# Patient Record
Sex: FEMALE | Race: BLACK OR AFRICAN AMERICAN
[De-identification: names, ages, dates, MRNs, and addresses within clinical notes are randomized per-mention and may not be internally consistent; named-entity substitution may affect disease eponyms.]

---

## 2023-05-30 ENCOUNTER — HOSPITAL ENCOUNTER (OUTPATIENT)
Age: 38
Setting detail: OBSERVATION
LOS: 4 days | Discharge: HOME | End: 2023-06-03
Payer: COMMERCIAL

## 2023-05-30 DIAGNOSIS — K21.9: ICD-10-CM

## 2023-05-30 DIAGNOSIS — R10.9: ICD-10-CM

## 2023-05-30 DIAGNOSIS — E87.6: ICD-10-CM

## 2023-05-30 DIAGNOSIS — R11.2: Primary | ICD-10-CM

## 2023-05-30 DIAGNOSIS — Z20.822: ICD-10-CM

## 2023-05-30 DIAGNOSIS — Z79.899: ICD-10-CM

## 2023-05-30 DIAGNOSIS — K50.90: ICD-10-CM

## 2023-05-30 PROCEDURE — 99285 EMERGENCY DEPT VISIT HI MDM: CPT

## 2023-05-30 PROCEDURE — 83690 ASSAY OF LIPASE: CPT

## 2023-05-30 PROCEDURE — 96376 TX/PRO/DX INJ SAME DRUG ADON: CPT

## 2023-05-30 PROCEDURE — 0202U NFCT DS 22 TRGT SARS-COV-2: CPT

## 2023-05-30 PROCEDURE — 82150 ASSAY OF AMYLASE: CPT

## 2023-05-30 PROCEDURE — 85652 RBC SED RATE AUTOMATED: CPT

## 2023-05-30 PROCEDURE — 96374 THER/PROPH/DIAG INJ IV PUSH: CPT

## 2023-05-30 PROCEDURE — 85025 COMPLETE CBC W/AUTO DIFF WBC: CPT

## 2023-05-30 PROCEDURE — 84703 CHORIONIC GONADOTROPIN ASSAY: CPT

## 2023-05-30 PROCEDURE — 80048 BASIC METABOLIC PNL TOTAL CA: CPT

## 2023-05-30 PROCEDURE — 74177 CT ABD & PELVIS W/CONTRAST: CPT

## 2023-05-30 PROCEDURE — 36415 COLL VENOUS BLD VENIPUNCTURE: CPT

## 2023-05-30 PROCEDURE — 87507 IADNA-DNA/RNA PROBE TQ 12-25: CPT

## 2023-05-30 PROCEDURE — G0378 HOSPITAL OBSERVATION PER HR: HCPCS

## 2023-05-30 PROCEDURE — 96375 TX/PRO/DX INJ NEW DRUG ADDON: CPT

## 2023-05-30 PROCEDURE — 80053 COMPREHEN METABOLIC PANEL: CPT

## 2023-05-30 PROCEDURE — 80076 HEPATIC FUNCTION PANEL: CPT

## 2023-05-30 PROCEDURE — 96361 HYDRATE IV INFUSION ADD-ON: CPT

## 2023-05-30 PROCEDURE — 81003 URINALYSIS AUTO W/O SCOPE: CPT

## 2023-05-30 PROCEDURE — 76705 ECHO EXAM OF ABDOMEN: CPT

## 2023-06-01 VITALS
TEMPERATURE: 100 F | SYSTOLIC BLOOD PRESSURE: 183 MMHG | OXYGEN SATURATION: 99 % | RESPIRATION RATE: 18 BRPM | DIASTOLIC BLOOD PRESSURE: 79 MMHG | HEART RATE: 80 BPM

## 2023-06-01 VITALS — SYSTOLIC BLOOD PRESSURE: 144 MMHG | DIASTOLIC BLOOD PRESSURE: 85 MMHG

## 2023-06-01 VITALS
DIASTOLIC BLOOD PRESSURE: 100 MMHG | OXYGEN SATURATION: 97 % | RESPIRATION RATE: 20 BRPM | TEMPERATURE: 98.96 F | HEART RATE: 86 BPM | SYSTOLIC BLOOD PRESSURE: 160 MMHG

## 2023-06-01 LAB
ALANINE AMINOTRANSFERASE: 22 U/L (ref 14–59)
ALBUMIN GLOBULIN RATIO: 0.8
ALBUMIN LEVEL: 3.8 G/DL (ref 3.4–5)
ALKALINE PHOSPHATASE: 85 U/L (ref 46–116)
ASPARTATE AMINO TRANSFERASE: 11 U/L (ref 15–37)
GLOBULIN: 4.8 G/DL
TOTAL PROTEIN: 8.6 G/DL (ref 6.4–8.2)

## 2023-06-01 NOTE — P.PN_ITS
Progress Note: Subjective    
Subjective    
Interval history:     
patient is a 37-year-old -American female with past medical history of   
Crohn's disease GERD who presented with a one-day history of nausea vomiting and  
diarrhea. She reports that her son also had the same symptoms within the last   
few days. She had tried and failed oral antiemetics. She presented to the   
Emergency Room with uncontrolled vomiting and diarrhea despite trying the Zofran  
and Phenergan. CT scan per the emergency department did not reveal any acute   
Crohn's flare, or any acute processes. Patient was admitted to the medicine   
floor for IV fluids and vomiting control. Patient has not improved like expected  
over the course of yesterday, is still requiring some IV antiemetics and some IV  
fluids and is still here this morning. She denies any fevers or chills overnight  
but has been experiencing some increased belly pain right upper quadrant and   
right lower quadrant. She denies any diarrhea but has had some Imodium. She   
still is not able to hold down clear liquids    
    
Exam    
    
    
Narrative:   Exam Narrative:     
    
General: Patient is alert, and oriented to person, place and time with normal   
affect, proper hygiene     
Skin: no visible rashes, or ulcers    
Head: atraumatic, acephalic    
Neck: no masses palpated, normal thyroid, no JVD or audible carotid bruits     
Heart: Normal rate and rhythm, no murmurs/rubs/gallops    
Lungs: no audible wheezes, crackles and normal breath sounds all lung fields     
Abdomen: Normal audible bowel sounds, no distension, No palpable masses, no   
organomegaly, diffuse tenderness without rebound/guarding/ or rigidity    
Musculoskeletal: muscle atrophy noted, ROM is limited due to being in hospital   
bed, no swelling bilateral lower extremities    
Vascular: Normal carotid, radial, femoral, posterior tibial, and dorsalis pedis   
pulses    
Lymph: no supraclavicular, axillary, or anterior/posterior cervical adenopathy     
Neuro: CN II-X grossly intact, normal sensation upper and lower extremities    
       
    
    
Progress Note: Objective    
Labs    
Labs:     
                                 Liver Function    
    
    
    
  06/01/23 Range/Units    
    
  04:49     
     
Total Bilirubin  0.4  (0.2-1.0)  mg/dL    
     
Direct Bilirubin  0.1  (0.0-0.2)  mg/dL    
     
AST  11 L  (15-37)  U/L    
     
ALT  22  (14-59)  U/L    
     
Albumin  3.8  (3.4-5.0)  g/dL    
    
    
    
    
Progress Note: A&P    
Assessment and Plan    
(1) Intractable vomiting with nausea:     
(2) Abdominal pain:     
(3) Crohn's disease:     
    
Plan    
#1 intractable vomiting and diarrhea-most likely etiology is viral   
gastroenteritis. We'll treat with IV Phenergan and IV Zofran, IV fluids, clear   
liquid diet, Bentyl and will be discharged home once able to keep down oral or   
liquid. With worsening abdominal pain today rechecked liver enzymes which were   
within normal limits and we'll check a right upper quadrant ultrasound. Again CT  
scan was reviewed from the time of admission and showed no acute findings   
including the gallbladder.    
#2 Crohn's disease appears in no active flare    
#3 GERD continue IV Pepcid    
Patient is a full code     
patient will be admitted to observation status and is not expected to stay more   
than 2 nights    
Fall Risk Details    
Current Medications:     
                               Current Medications    
    
Al Hydrox/Mg Hydrox/Simethicone (Maalox (Mag Hydrox/Aluminum Hyd/Simeth) 30 Ml   
Oral.Susp)  30 ml PO Q4H PRN    
   PRN Reason: Indigestion    
Dextrose/Sodium Chloride (Dextrose 5 %-0.45 % Sod Chlor 1,000 Ml Iv.Soln)  75 ml  
IV CONT LUIS M    
Famotidine (Famotidine/Pf 20 Mg/2 Ml Vial)  20 mg IV Q12H LUIS M    
   Last Admin: 06/01/23 10:18 Dose:  20 mg    
       
Hydromorphone HCl (Hydromorphone Hcl 1 Mg/Ml Cartridge)  0.2 mg IVP Q4HWA PRN    
   PRN Reason: Pain    
   Last Admin: 06/01/23 10:35 Dose:  0.2 mg    
       
Potassium Chloride 40 meq/ (Sodium Chloride)  270 mls @ 67.5 mls/hr IV ONCE ONE    
   Stop: 06/01/23 14:48    
   Last Admin: 06/01/23 11:27 Dose:  40 ml/hr, 40 mls/hr    
       
Ondansetron HCl (Ondansetron Pf 4 Mg/2 Ml Vial)  4 mg IV Q6H PRN    
   PRN Reason: Nausea    
Polyethylene Glycol (Polyethylene Glycol 3350 17 Gm Powder Packet)  17 gm PO QD   
PRN    
   PRN Reason: Constipation    
Promethazine HCl (Promethazine Hcl 25 Mg/Ml Vial)  12.5 mg IV Q4H PRN    
   PRN Reason: Nausea And Vomiting    
   Last Admin: 06/01/23 10:35 Dose:  12.5 mg    
       
Tramadol HCl (Tramadol Hcl 50 Mg Tablet)  50 mg PO Q4HWA PRN    
   PRN Reason: Pain    
    
    
Time Spent With Patient    
Time:     
Total time spent is greater than 50% in coordination of care (as documented) at   
patient's floor/unit and/or counseling patient:    
    
Time with patient: less than 15 minutes

## 2023-06-01 NOTE — US_ITS
[address] [address] 
     [phone] 
  
  
Patient Name: 
JOSE ELIAS VILLANUEVA 
  
MRN: [id number]    YOB: 1985    Sex: F 
[facility] Location: [facility] 
Current Patient Location: [facility] 
Accession/Order Number: [id number] 
Exam Date: 6/01/2023  11:00    Report Date: 6/01/2023  11:42 
  
At the request of: 
АНДРЕЙ DARBY   
  
Procedure:  US right upper quadrant 
  
EXAMINATION: US right upper quadrant  
  
HISTORY: vomiting and RUQ pain , epigastric pain, nausea 
  
COMPARISON: CT abdomen pelvis 5/30/2023 
  
TECHNIQUE: Transabdominal evaluation of the right upper quadrant. 
  
FINDINGS: 
LIVER: Slightly increased echogenicity suggestive of fatty infiltration. Color  
  
Doppler demonstrates patent hepatic veins. 
PORTAL VEIN: Duplex Doppler demonstrates normal hepatopetal flow pattern with  
flow velocity averaging 34 cm/s. 
GALLBLADDER: No visible gallstones, wall thickening, or pericholecystic free  
fluid. Negative sonographic Tucker's sign. 
BILIARY: No abnormal dilation or stones. Common bile duct diameter is within  
normal limits. 
PANCREASE: No visible mass, abnormal atrophy, or duct dilation. 
KIDNEY: No hydronephrosis. No visible mass or stones. Size: 11.3 x 4.7 x 5.2  
cm 
  
IMPRESSION:  
  
1. Mild fatty infiltration of the liver. 
2. Otherwise unremarkable right upper quadrant. 
  
  
Electronically authenticated by: ROSIBEL LEBLANC   Date: 6/01/2023  11:42

## 2023-06-02 VITALS
HEART RATE: 94 BPM | OXYGEN SATURATION: 97 % | DIASTOLIC BLOOD PRESSURE: 96 MMHG | SYSTOLIC BLOOD PRESSURE: 170 MMHG | RESPIRATION RATE: 18 BRPM | TEMPERATURE: 99.5 F

## 2023-06-02 VITALS — SYSTOLIC BLOOD PRESSURE: 190 MMHG | DIASTOLIC BLOOD PRESSURE: 108 MMHG

## 2023-06-02 VITALS
HEART RATE: 85 BPM | TEMPERATURE: 98.1 F | OXYGEN SATURATION: 97 % | SYSTOLIC BLOOD PRESSURE: 141 MMHG | RESPIRATION RATE: 16 BRPM | DIASTOLIC BLOOD PRESSURE: 80 MMHG

## 2023-06-02 VITALS
DIASTOLIC BLOOD PRESSURE: 106 MMHG | HEART RATE: 104 BPM | OXYGEN SATURATION: 98 % | TEMPERATURE: 98.8 F | SYSTOLIC BLOOD PRESSURE: 190 MMHG | RESPIRATION RATE: 16 BRPM

## 2023-06-02 VITALS — OXYGEN SATURATION: 99 %

## 2023-06-02 VITALS — SYSTOLIC BLOOD PRESSURE: 190 MMHG | DIASTOLIC BLOOD PRESSURE: 106 MMHG

## 2023-06-02 LAB
ADD MANUAL DIFF: NO
ALANINE AMINOTRANSFERASE: 19 U/L (ref 14–59)
ALBUMIN GLOBULIN RATIO: 0.8
ALBUMIN LEVEL: 3.7 G/DL (ref 3.4–5)
ALKALINE PHOSPHATASE: 83 U/L (ref 46–116)
ANION GAP: 15.7
ASPARTATE AMINO TRANSFERASE: 11 U/L (ref 15–37)
BLOOD UREA NITROGEN: 10 MG/DL (ref 7–18)
CALCIUM: 9.3 MG/DL (ref 8.5–10.1)
CARBON DIOXIDE: 25 MMOL/L (ref 21–32)
CHLORIDE: 99 MMOL/L (ref 98–107)
CO2 BLD-SCNC: 25 MMOL/L (ref 21–32)
ESTIMATED GFR (AFRICAN AMERICA: >60 (ref 60–?)
ESTIMATED GFR (NON-AFRICAN AME: >60 (ref 60–?)
GLOBULIN: 4.8 G/DL
GLUCOSE BLD-MCNC: 131 MG/DL (ref 74–106)
HCT VFR BLD CALC: 43.1 % (ref 36–48)
HEMATOCRIT: 43.1 % (ref 36–48)
HEMOGLOBIN: 14.7 G/DL (ref 12–16)
IMMATURE GRANULOCYTES ABS AUTO: 0.04 10^3/UL (ref 0–0.03)
IMMATURE GRANULOCYTES PCT AUTO: 0.3 % (ref 0–0.5)
LYMPHOCYTES  ABSOLUTE AUTO: 3.2 10^3/UL (ref 1.2–3.8)
MCV RBC: 84 FL (ref 81–99)
MEAN CORPUSCULAR HEMOGLOBIN: 28.7 PG (ref 26.7–34)
MEAN CORPUSCULAR HGB CONC: 34.1 G/DL (ref 29.9–35.2)
MEAN CORPUSCULAR VOLUME: 84 FL (ref 81–99)
PLATELET # BLD: 405 10^3/UL (ref 150–450)
PLATELET COUNT: 405 10^3/UL (ref 150–450)
POTASSIUM SERPLBLD-SCNC: 2.7 MMOL/L (ref 3.5–5.1)
POTASSIUM: 2.7 MMOL/L (ref 3.5–5.1)
RED BLOOD COUNT: 5.13 10^6/UL (ref 4.2–5.4)
SODIUM BLD-SCNC: 137 MMOL/L (ref 136–145)
SODIUM: 137 MMOL/L (ref 136–145)
TOTAL PROTEIN: 8.5 G/DL (ref 6.4–8.2)
WBC # BLD: 13.5 10^3/UL (ref 4–11)
WHITE BLOOD COUNT: 13.5 10^3/UL (ref 4–11)

## 2023-06-02 NOTE — CM.NOTE
Rounds made with Dr. Mccormack, pt continues with nausea and dirrhea.  Dr. Mccormack will order respiratory panel for today and possible discharge to home this afternoon.

## 2023-06-02 NOTE — PM.PN
Progress Note: Subjective
Subjective
Interval history: 
patient is a 37-year-old -American female with past medical history of Crohn's disease GERD who presented with a one-day history of nausea vomiting and diarrhea. She reports that her son also had the same symptoms within the last few days. 
She had tried and failed oral antiemetics. She presented to the Emergency Room with uncontrolled vomiting and diarrhea despite trying the Zofran and Phenergan. CT scan per the emergency department did not reveal any acute Crohn's flare, or any acute 
processes. Patient was admitted to the medicine floor for IV fluids and vomiting control. Patient has not improved like expected over the course of yesterday, is still requiring some IV antiemetics and some IV fluids and is still here this morning. 
She denies any fevers or chills overnight but has been experiencing some increased belly pain right upper quadrant and right lower quadrant. She denies any diarrhea but has had some Imodium. She still is not able to hold down clear liquids, vomited 
jello this morning.

Exam
Narrative:   Exam Narrative: 

General: Patient is alert, and oriented to person, place and time with normal affect, proper hygiene 
Skin: no visible rashes, or ulcers
Head: atraumatic, acephalic
Heart: Normal rate and rhythm, no murmurs/rubs/gallops
Lungs: no audible wheezes, crackles and normal breath sounds all lung fields 
Abdomen: Normal audible bowel sounds, no distension, No palpable masses, no organomegaly, diffuse pain with no guarding or rigidity
Musculoskeletal: muscle atrophy noted, ROM is limited due to being in hospital bed, no swelling bilateral lower extremities
Vascular: Normal carotid, radial, femoral, posterior tibial, and dorsalis pedis pulses
Lymph: no supraclavicular, axillary, or anterior/posterior cervical adenopathy 
Neuro: CN II-X grossly intact, normal sensation upper and lower extremities
 
Constitutional:   Vital Signs, click to edit/add: 

Vital Signs - 24 hr

                    06/01/23
20:53      06/01/23
22:18      06/02/23
05:51
Temperature         100 F H                                 99.5 F
Pulse Rate          80                                      94 H
Respiratory Rate    18                                      18
Blood Pressure [Le
ft Arm]             183/79 H            144/85 H            170/96 H
Pulse Oximetry      99                                      97
Oxygen Delivery Me
thod                Room Air                                Room Air



 

Progress Note: Objective
Labs
Labs: 
Short CBC

  06/02/23 Range/Units
  04:19 
WBC  13.5 H  (4.0-11.0)  10^3/uL
Hgb  14.7  (12.0-16.0)  g/dL
Hct  43.1  (36.0-48.0)  %
Plt Count  405  (150-450)  10^3/uL

BMP

  06/02/23
  04:19
Sodium  137
Potassium  2.7 L*
Chloride  99
Carbon Dioxide  25.0
BUN  10.0
Creatinine  0.91
Glucose  131 H
Calcium  9.3

Liver Function

  06/02/23 Range/Units
  04:19 
Total Bilirubin  0.3  (0.2-1.0)  mg/dL
AST  11 L  (15-37)  U/L
ALT  19  (14-59)  U/L
Albumin  3.7  (3.4-5.0)  g/dL



Progress Note: A&P
Assessment and Plan
(1) Intractable vomiting with nausea: 
(2) Abdominal pain: 
(3) Crohn's disease: 

Plan
?#1intractable vomiting and diarrhea-most likely etiology is viral gastroenteritis. We'll treat with IV Phenergan and IV Zofran, IV fluids, clear liquid diet, Bentyl and will be discharged home once able to keep down oral or liquid. With worsening 
abdominal pain today rechecked liver enzymes which were within normal limits and we'll check a right upper quadrant ultrasound. Again CT scan was reviewed from the time of admission and showed no acute findings including the gallbladder. respiratory 
panel negative for flu or covid. symptom management only, d/c one able to tolerate clears
#2 Crohn's disease appears in no active flare
#3 GERD continue IV Pepcid
#4 hypokalemia- from vomiting and diarrhea, replace IV until tolerating PO
Patient is a full code
patient will be admitted to observation status and is not expected to stay more than 2 nights
Fall Risk Details
Choudhary Fall Scale Risk Level: Moderate Fall Risk
Current Medications: 
Current Medications

Al Hydrox/Mg Hydrox/Simethicone (Maalox (Mag Hydrox/Aluminum Hyd/Simeth) 30 Ml Oral.Susp)  30 ml PO Q4H PRN
 PRN Reason: Indigestion
Famotidine (Famotidine/Pf 20 Mg/2 Ml Vial)  20 mg IV Q12H LUIS M
 Last Admin: 06/02/23 08:30 Dose:  20 mg
 
Hydralazine HCl (Hydralazine Hcl 20 Mg/Ml Vial)  10 mg IVP Q4H PRN
 PRN Reason: Blood Pressure - High
 Last Admin: 06/02/23 06:00 Dose:  10 mg
 
Hydromorphone HCl (Hydromorphone Hcl 0.5 Mg/0.5 Ml Syringe)  0.2 mg IV Q4H PRN
 PRN Reason: P
 Last Admin: 06/02/23 11:45 Dose:  0.2 mg
 
Dextrose/Sodium Chloride (D5%-0.45% Nacl Iv Soln)  1,000 mls @ 75 mls/hr IV [id number]
 Last Admin: 06/02/23 04:59 Dose:  75 mls/hr
 
Methylprednisolone Sodium Succinate (Methylprednisolone Sod Succ Pf 40 Mg/Ml Vial)  40 mg IVP Q6H [facility]
 Last Admin: 06/02/23 08:29 Dose:  40 mg
 
Ondansetron HCl (Ondansetron Pf 4 Mg/2 Ml Vial)  4 mg IV Q6H PRN
 PRN Reason: Nausea
 Last Admin: 06/02/23 06:03 Dose:  4 mg
 
Polyethylene Glycol (Polyethylene Glycol 3350 17 Gm Powder Packet)  17 gm PO QD PRN
 PRN Reason: Constipation
Promethazine HCl (Promethazine Hcl 25 Mg/Ml Vial)  12.5 mg IV Q4H PRN
 PRN Reason: Nausea And Vomiting
 Last Admin: 06/02/23 11:45 Dose:  12.5 mg
 
Tramadol HCl (Tramadol Hcl 50 Mg Tablet)  50 mg PO Q4HWA PRN
 PRN Reason: Pain


Time Spent With Patient
Time: 
Total time spent is greater than 50% in coordination of care (as documented) at patient's floor/unit and/or counseling patient:

## 2023-06-02 NOTE — P.PN_ITS
Progress Note: Subjective    
Subjective    
Interval history:     
patient is a 37-year-old -American female with past medical history of   
Crohn's disease GERD who presented with a one-day history of nausea vomiting and  
diarrhea. She reports that her son also had the same symptoms within the last   
few days. She had tried and failed oral antiemetics. She presented to the   
Emergency Room with uncontrolled vomiting and diarrhea despite trying the Zofran  
and Phenergan. CT scan per the emergency department did not reveal any acute   
Crohn's flare, or any acute processes. Patient was admitted to the medicine   
floor for IV fluids and vomiting control. Patient has not improved like expected  
over the course of yesterday, is still requiring some IV antiemetics and some IV  
fluids and is still here this morning. She denies any fevers or chills overnight  
but has been experiencing some increased belly pain right upper quadrant and   
right lower quadrant. She denies any diarrhea but has had some Imodium. She   
still is not able to hold down clear liquids, vomited jello this morning.    
    
Exam    
    
    
Narrative:   Exam Narrative:     
    
General: Patient is alert, and oriented to person, place and time with normal   
affect, proper hygiene     
Skin: no visible rashes, or ulcers    
Head: atraumatic, acephalic    
Heart: Normal rate and rhythm, no murmurs/rubs/gallops    
Lungs: no audible wheezes, crackles and normal breath sounds all lung fields     
Abdomen: Normal audible bowel sounds, no distension, No palpable masses, no   
organomegaly, diffuse pain with no guarding or rigidity    
Musculoskeletal: muscle atrophy noted, ROM is limited due to being in hospital   
bed, no swelling bilateral lower extremities    
Vascular: Normal carotid, radial, femoral, posterior tibial, and dorsalis pedis   
pulses    
Lymph: no supraclavicular, axillary, or anterior/posterior cervical adenopathy     
Neuro: CN II-X grossly intact, normal sensation upper and lower extremities    
       
     
Constitutional:   Vital Signs, click to edit/add:     
    
                               Vital Signs - 24 hr    
    
                    06/01/23    
20:53      06/01/23    
22:18      06/02/23    
05:51    
Temperature         100 F H                                 99.5 F    
Pulse Rate          80                                      94 H    
Respiratory Rate    18                                      18    
Blood Pressure [Le    
ft Arm]             183/79 H            144/85 H            170/96 H    
Pulse Oximetry      99                                      97    
Oxygen Delivery Me    
thod                Room Air                                Room Air    
    
    
    
       
    
    
Progress Note: Objective    
Labs    
Labs:     
                                    Short CBC    
    
    
    
  06/02/23 Range/Units    
    
  04:19     
     
WBC  13.5 H  (4.0-11.0)  10^3/uL    
     
Hgb  14.7  (12.0-16.0)  g/dL    
     
Hct  43.1  (36.0-48.0)  %    
     
Plt Count  405  (150-450)  10^3/uL    
    
    
                                       BMP    
    
    
    
  06/02/23    
    
  04:19    
     
Sodium  137    
     
Potassium  2.7 L*    
     
Chloride  99    
     
Carbon Dioxide  25.0    
     
BUN  10.0    
     
Creatinine  0.91    
     
Glucose  131 H    
     
Calcium  9.3    
    
    
                                 Liver Function    
    
    
    
  06/02/23 Range/Units    
    
  04:19     
     
Total Bilirubin  0.3  (0.2-1.0)  mg/dL    
     
AST  11 L  (15-37)  U/L    
     
ALT  19  (14-59)  U/L    
     
Albumin  3.7  (3.4-5.0)  g/dL    
    
    
    
    
Progress Note: A&P    
Assessment and Plan    
(1) Intractable vomiting with nausea:     
(2) Abdominal pain:     
(3) Crohn's disease:     
    
Plan    
?#1intractable vomiting and diarrhea-most likely etiology is viral   
gastroenteritis. We'll treat with IV Phenergan and IV Zofran, IV fluids, clear   
liquid diet, Bentyl and will be discharged home once able to keep down oral or   
liquid. With worsening abdominal pain today rechecked liver enzymes which were   
within normal limits and we'll check a right upper quadrant ultrasound. Again CT  
scan was reviewed from the time of admission and showed no acute findings   
including the gallbladder. respiratory panel negative for flu or covid. symptom   
management only, d/c one able to tolerate clears    
#2 Crohn's disease appears in no active flare    
#3 GERD continue IV Pepcid    
#4 hypokalemia- from vomiting and diarrhea, replace IV until tolerating PO    
Patient is a full code    
patient will be admitted to observation status and is not expected to stay more   
than 2 nights    
Fall Risk Details    
Choudhary Fall Scale Risk Level: Moderate Fall Risk    
Current Medications:     
                               Current Medications    
    
Al Hydrox/Mg Hydrox/Simethicone (Maalox (Mag Hydrox/Aluminum Hyd/Simeth) 30 Ml   
Oral.Susp)  30 ml PO Q4H PRN    
   PRN Reason: Indigestion    
Famotidine (Famotidine/Pf 20 Mg/2 Ml Vial)  20 mg IV Q12H LUIS M    
   Last Admin: 06/02/23 08:30 Dose:  20 mg    
       
Hydralazine HCl (Hydralazine Hcl 20 Mg/Ml Vial)  10 mg IVP Q4H PRN    
   PRN Reason: Blood Pressure - High    
   Last Admin: 06/02/23 06:00 Dose:  10 mg    
       
Hydromorphone HCl (Hydromorphone Hcl 0.5 Mg/0.5 Ml Syringe)  0.2 mg IV Q4H PRN    
   PRN Reason: P    
   Last Admin: 06/02/23 11:45 Dose:  0.2 mg    
       
Dextrose/Sodium Chloride (D5%-0.45% Nacl Iv Soln)  1,000 mls @ 75 mls/hr IV .  
[id number] [facility]    
   Last Admin: 06/02/23 04:59 Dose:  75 mls/hr    
       
Methylprednisolone Sodium Succinate (Methylprednisolone Sod Succ Pf 40 Mg/Ml   
Vial)  40 mg IVP Q6H [facility]    
   Last Admin: 06/02/23 08:29 Dose:  40 mg    
       
Ondansetron HCl (Ondansetron Pf 4 Mg/2 Ml Vial)  4 mg IV Q6H PRN    
   PRN Reason: Nausea    
   Last Admin: 06/02/23 06:03 Dose:  4 mg    
       
Polyethylene Glycol (Polyethylene Glycol 3350 17 Gm Powder Packet)  17 gm PO QD   
PRN    
   PRN Reason: Constipation    
Promethazine HCl (Promethazine Hcl 25 Mg/Ml Vial)  12.5 mg IV Q4H PRN    
   PRN Reason: Nausea And Vomiting    
   Last Admin: 06/02/23 11:45 Dose:  12.5 mg    
       
Tramadol HCl (Tramadol Hcl 50 Mg Tablet)  50 mg PO Q4HWA PRN    
   PRN Reason: Pain    
    
    
Time Spent With Patient    
Time:     
Total time spent is greater than 50% in coordination of care (as documented) at   
patient's floor/unit and/or counseling patient:

## 2023-06-03 VITALS
HEART RATE: 91 BPM | RESPIRATION RATE: 16 BRPM | SYSTOLIC BLOOD PRESSURE: 148 MMHG | TEMPERATURE: 98 F | DIASTOLIC BLOOD PRESSURE: 82 MMHG | OXYGEN SATURATION: 96 %

## 2023-06-03 LAB
ADD MANUAL DIFF: NO
ALANINE AMINOTRANSFERASE: 22 U/L (ref 14–59)
ALBUMIN GLOBULIN RATIO: 0.8
ALBUMIN LEVEL: 3.7 G/DL (ref 3.4–5)
ALKALINE PHOSPHATASE: 79 U/L (ref 46–116)
ANION GAP: 14.4
ASPARTATE AMINO TRANSFERASE: 14 U/L (ref 15–37)
BLOOD UREA NITROGEN: 16 MG/DL (ref 7–18)
CALCIUM: 9.6 MG/DL (ref 8.5–10.1)
CARBON DIOXIDE: 25.7 MMOL/L (ref 21–32)
CHLORIDE: 99 MMOL/L (ref 98–107)
CO2 BLD-SCNC: 25.7 MMOL/L (ref 21–32)
ESTIMATED GFR (AFRICAN AMERICA: >60 (ref 60–?)
ESTIMATED GFR (NON-AFRICAN AME: >60 (ref 60–?)
GLOBULIN: 4.6 G/DL
GLUCOSE BLD-MCNC: 121 MG/DL (ref 74–106)
HCT VFR BLD CALC: 44 % (ref 36–48)
HEMATOCRIT: 44 % (ref 36–48)
HEMOGLOBIN: 15 G/DL (ref 12–16)
IMMATURE GRANULOCYTES ABS AUTO: 0.05 10^3/UL (ref 0–0.03)
IMMATURE GRANULOCYTES PCT AUTO: 0.3 % (ref 0–0.5)
LYMPHOCYTES  ABSOLUTE AUTO: 2.5 10^3/UL (ref 1.2–3.8)
MCV RBC: 84.6 FL (ref 81–99)
MEAN CORPUSCULAR HEMOGLOBIN: 28.8 PG (ref 26.7–34)
MEAN CORPUSCULAR HGB CONC: 34.1 G/DL (ref 29.9–35.2)
MEAN CORPUSCULAR VOLUME: 84.6 FL (ref 81–99)
PLATELET # BLD: 425 10^3/UL (ref 150–450)
PLATELET COUNT: 425 10^3/UL (ref 150–450)
POTASSIUM SERPLBLD-SCNC: 3.1 MMOL/L (ref 3.5–5.1)
POTASSIUM: 3.1 MMOL/L (ref 3.5–5.1)
RED BLOOD COUNT: 5.2 10^6/UL (ref 4.2–5.4)
SODIUM BLD-SCNC: 136 MMOL/L (ref 136–145)
SODIUM: 136 MMOL/L (ref 136–145)
TOTAL PROTEIN: 8.3 G/DL (ref 6.4–8.2)
WBC # BLD: 17.8 10^3/UL (ref 4–11)
WHITE BLOOD COUNT: 17.8 10^3/UL (ref 4–11)

## 2023-06-03 NOTE — P.DS_ITS
DS: Providers    
Provider    
Date of admission:     
05/30/23 22:37    
    
Primary care physician:     
TEST TEST    
    
    
DS: Diagnosis    
Discharge Diagnosis    
(1) Intractable vomiting with nausea:     
(2) Abdominal pain:     
(3) Crohn's disease:    
    
DS: Summary    
Hospital Course    
Hospital Course:    
Patient admitted with increasing nausea vomiting and diarrhea.  Has a history of  
Crohn's colitis.  Exposed to a relative who had similar symptoms.  Evaluation   
did show some mild dehydration symptoms as well as leukocytosis.  This was felt   
to be based on exam more a flareup of her Crohn's colitis.  Patient felt the   
same.  Treated with steroids.  She is somewhat better today.  The plan is if she  
tolerates breakfast and lunch she can be discharged home in improving condition.  
 Medications see list.  Follow-up with PCP and gastroenterology within the next   
week.    
Status at Discharge    
Functional status at discharge: independent ambulation    
Overall status at discharge: patient is progressing back to baseline    
Time Spent with Patient    
Time attestation:     
Total time spent providing and/or coordinating discharge services:    
    
    
Exam    
Constitutional    
                               Vital Signs - 24 hr    
    
    
    
 06/02/23    
14:29 06/02/23    
14:51 06/02/23    
17:36    
     
Temperature 98.8 F      
     
Pulse Rate 104 H      
     
Respiratory Rate 16      
     
Blood Pressure  190/106 H     
     
Blood Pressure [Left Arm] 190/106 H  190/108 H    
     
Pulse Oximetry 98      
     
Oxygen Delivery Method Room Air      
    
    
    
    
 06/02/23    
20:37 06/02/23    
22:32 06/03/23    
04:17    
     
Temperature 98.1 F  98.0 F    
     
Pulse Rate 85  91 H    
     
Respiratory Rate 16  16    
     
Blood Pressure       
     
Blood Pressure [Left Arm] 141/80 H  148/82 H    
     
Pulse Oximetry 97 99 96    
     
Oxygen Delivery Method Room Air  Room Air    
    
    
    
Respiratory    
Common normals: normal respiratory effort    
Cardio    
Common normals: regular rate and regular rhythm    
GI    
Palpation: tender;     
no rebound tenderness present    
    
DS: Data    
Data Completed and Pending    
Labs on day of discharge:     
                             Labs from last 24 hours    
    
    
    
  06/03/23 06/02/23    
    
  08:05 09:45    
     
WBC  17.8 H     
     
RBC  5.20     
     
Hgb  15.0     
     
Hct  44.0     
     
MCV  84.6     
     
MCH  28.8     
     
MCHC  34.1     
     
RDW  13.7     
     
Plt Count  425     
     
MPV  9.5     
     
Neut % (Auto)  81.3 H     
     
Lymph % (Auto)  13.8 L     
     
Mono % (Auto)  4.4     
     
Eos % (Auto)  0.1 L     
     
Baso % (Auto)  0.1 L     
     
Neut # (Auto)  14.5 H     
     
Lymph # (Auto)  2.5     
     
Mono # (Auto)  0.8     
     
Eos # (Auto)  0.0     
     
Baso # (Auto)  0.0     
     
ESR  41 H     
     
Sodium  136     
     
Potassium  3.1 L     
     
Chloride  99     
     
Carbon Dioxide  25.7     
     
Anion Gap  14.4     
     
BUN  16.0     
     
Creatinine  0.84     
     
Est GFR ( Amer)  >60     
     
Est GFR (Non-Af Amer)  >60     
     
BUN/Creatinine Ratio  19.0     
     
Glucose  121 H     
     
Calcium  9.6     
     
Total Bilirubin  0.6     
     
AST  14 L     
     
ALT  22     
     
Total Protein  8.3 H     
     
Albumin  3.7     
     
Globulin  4.6     
     
Albumin/Globulin Ratio  0.8     
     
Adenovirus (PCR)   Not detected    
     
C. pneumoniae DNA (PCR)   Not detected    
     
Coronavirus Type OC43   Not detected    
     
Coronavirus Type HKU1   Not detected    
     
Coronavirus Type 229E   Not detected    
     
Coronavirus Type NL63   Not detected    
     
Human Metapneumovir PCR   Not detected    
     
M. pneumoniae (PCR)   Not detected    
     
Parainfluenza PCR   Not detected    
     
Parainfluenza 2 (PCR)   Not detected    
     
Parainfluenza 3 (PCR)   Not detected    
     
Parainfluenza 4 (PCR)   Not detected    
     
RSV (RT-PCR)   Not detected    
     
Entero/Rhino (PCR)   Not detected    
     
SARS-CoV-2 (PCR)   Not detected    
     
Bordetella pertussis (PCR)   Not detected    
     
B parapertussis DNA PCR   Not detected    
     
Influenza Type A (PCR)   Not detected    
     
Influenza Type B (PCR)   Not detected    
    
    
    
    
    
Discharge Plan    
Discharge    
Disposition: Home, Self-Care    
    
Discharge Medications:    
New    
  promethazine 25 mg suppository     
   25 mg [place] Q6H PRN (Reason: vomiting) Qty: 12 0RF    
  ondansetron 4 mg tablet,disintegrating     
   4 mg PO Q8H PRN (Reason: nausea and vomiting) 4 Days Qty: 14 0RF    
  hyoscyamine sulfate 0.125 mg Tablet, Sublingual     
   0.125 mg sublingual QID Qty: 30 0RF    
  prednisone 20 mg tablet     
   20 mg PO TID Qty: 15 0RF    
    
Continued    
  dicyclomine 20 mg tablet     
   20 mg PO TID     
   Rx Instructions:    
   X 90 DAYS    
    
No Action    
  promethazine 25 mg tablet     
   12.5 mg PO Q4H PRN (Reason: nausea and vomiting)     
  butalbital-acetaminophen-caff -40 mg tablet     
   1 tab PO BID PRN (Reason: headache)     
    
Activity: increase activity as tolerated    
    
Diet: advance to your usual diet    
    
Patient Instructions:  Hyoscyamine (By mouth), Prednisone (By mouth),   
Ondansetron (By mouth, Into the mouth), Promethazine (Into the rectum), Crohn   
Disease (ED), Abdominal Pain (ED)    
    
Forms:  Portal Instructions    
    
Follow Up Appointments: Schedule follow up with primary care doctor in 1 week.

## 2023-06-03 NOTE — PC.NURSE
fresh ice provided per request, pt resting in bed w/o any further s/s or voiced c/o pain or nausea noted.

## 2023-06-03 NOTE — PM.DS1
DS: Providers
Provider
Date of admission: 
05/30/23 22:37

Primary care physician: 
TEST TEST


DS: Diagnosis
Discharge Diagnosis
(1) Intractable vomiting with nausea: 
(2) Abdominal pain: 
(3) Crohn's disease:

DS: Summary
Hospital Course
Hospital Course:
Patient admitted with increasing nausea vomiting and diarrhea.  Has a history of Crohn's colitis.  Exposed to a relative who had similar symptoms.  Evaluation did show some mild dehydration symptoms as well as leukocytosis.  This was felt to be 
based on exam more a flareup of her Crohn's colitis.  Patient felt the same.  Treated with steroids.  She is somewhat better today.  The plan is if she tolerates breakfast and lunch she can be discharged home in improving condition.  Medications see 
list.  Follow-up with PCP and gastroenterology within the next week.
Status at Discharge
Functional status at discharge: independent ambulation
Overall status at discharge: patient is progressing back to baseline
Time Spent with Patient
Time attestation: 
Total time spent providing and/or coordinating discharge services:


Exam
Constitutional
Vital Signs - 24 hr

 06/02/23
14:29 06/02/23
14:51 06/02/23
17:36
Temperature 98.8 F  
Pulse Rate 104 H  
Respiratory Rate 16  
Blood Pressure  190/106 H 
Blood Pressure [Left Arm] 190/106 H  190/108 H
Pulse Oximetry 98  
Oxygen Delivery Method Room Air  

 06/02/23
20:37 06/02/23
22:32 06/03/23
04:17
Temperature 98.1 F  98.0 F
Pulse Rate 85  91 H
Respiratory Rate 16  16
Blood Pressure   
Blood Pressure [Left Arm] 141/80 H  148/82 H
Pulse Oximetry 97 99 96
Oxygen Delivery Method Room Air  Room Air


Respiratory
Common normals: normal respiratory effort
Cardio
Common normals: regular rate and regular rhythm
GI
Palpation: tender; 
no rebound tenderness present

DS: Data
Data Completed and Pending
Labs on day of discharge: 
Labs from last 24 hours

  06/03/23 06/02/23
  08:05 09:45
WBC  17.8 H 
RBC  5.20 
Hgb  15.0 
Hct  44.0 
MCV  84.6 
MCH  28.8 
MCHC  34.1 
RDW  13.7 
Plt Count  425 
MPV  9.5 
Neut % (Auto)  81.3 H 
Lymph % (Auto)  13.8 L 
Mono % (Auto)  4.4 
Eos % (Auto)  0.1 L 
Baso % (Auto)  0.1 L 
Neut # (Auto)  14.5 H 
Lymph # (Auto)  2.5 
Mono # (Auto)  0.8 
Eos # (Auto)  0.0 
Baso # (Auto)  0.0 
ESR  41 H 
Sodium  136 
Potassium  3.1 L 
Chloride  99 
Carbon Dioxide  25.7 
Anion Gap  14.4 
BUN  16.0 
Creatinine  0.84 
Est GFR ( Amer)  >60 
Est GFR (Non-Af Amer)  >60 
BUN/Creatinine Ratio  19.0 
Glucose  121 H 
Calcium  9.6 
Total Bilirubin  0.6 
AST  14 L 
ALT  22 
Total Protein  8.3 H 
Albumin  3.7 
Globulin  4.6 
Albumin/Globulin Ratio  0.8 
Adenovirus (PCR)   Not detected
C. pneumoniae DNA (PCR)   Not detected
Coronavirus Type OC43   Not detected
Coronavirus Type HKU1   Not detected
Coronavirus Type 229E   Not detected
Coronavirus Type NL63   Not detected
Human Metapneumovir PCR   Not detected
M. pneumoniae (PCR)   Not detected
Parainfluenza PCR   Not detected
Parainfluenza 2 (PCR)   Not detected
Parainfluenza 3 (PCR)   Not detected
Parainfluenza 4 (PCR)   Not detected
RSV (RT-PCR)   Not detected
Entero/Rhino (PCR)   Not detected
SARS-CoV-2 (PCR)   Not detected
Bordetella pertussis (PCR)   Not detected
B parapertussis DNA PCR   Not detected
Influenza Type A (PCR)   Not detected
Influenza Type B (PCR)   Not detected




Discharge Plan
Discharge
Disposition: Home, Self-Care

Discharge Medications:
New
  promethazine 25 mg suppository 
   25 mg [place] Q6H PRN (Reason: vomiting) Qty: 12 0RF
  ondansetron 4 mg tablet,disintegrating 
   4 mg PO Q8H PRN (Reason: nausea and vomiting) 4 Days Qty: 14 0RF
  hyoscyamine sulfate 0.125 mg Tablet, Sublingual 
   0.125 mg sublingual QID Qty: 30 0RF
  prednisone 20 mg tablet 
   20 mg PO TID Qty: 15 0RF

Continued
  dicyclomine 20 mg tablet 
   20 mg PO TID 
   Rx Instructions:
   X 90 DAYS

No Action
  promethazine 25 mg tablet 
   12.5 mg PO Q4H PRN (Reason: nausea and vomiting) 
  butalbital-acetaminophen-caff -40 mg tablet 
   1 tab PO BID PRN (Reason: headache) 

Activity: increase activity as tolerated

Diet: advance to your usual diet

Patient Instructions:  Hyoscyamine (By mouth), Prednisone (By mouth), Ondansetron (By mouth, Into the mouth), Promethazine (Into the rectum), Crohn Disease (ED), Abdominal Pain (ED)

Forms:  Portal Instructions

Follow Up Appointments: Schedule follow up with primary care doctor in 1 week.

## 2023-12-12 ENCOUNTER — TELEPHONE (OUTPATIENT)
Dept: CARDIOLOGY | Facility: CLINIC | Age: 38
End: 2023-12-12
Payer: COMMERCIAL

## 2023-12-12 NOTE — TELEPHONE ENCOUNTER
Left message with [facility] in regards to referral sent over. It looks like they have ordered patient a stress test and we need to know when that is prior to scheduling referral appt.   [place] encinas, MIRZA

## 2024-09-30 ENCOUNTER — HOSPITAL ENCOUNTER (INPATIENT)
Dept: HOSPITAL 101 - ER | Age: 39
LOS: 3 days | Discharge: HOME | DRG: 245 | End: 2024-10-03
Payer: COMMERCIAL

## 2024-09-30 VITALS
SYSTOLIC BLOOD PRESSURE: 161 MMHG | TEMPERATURE: 100.1 F | OXYGEN SATURATION: 98 % | DIASTOLIC BLOOD PRESSURE: 86 MMHG | HEART RATE: 89 BPM

## 2024-09-30 VITALS — OXYGEN SATURATION: 100 % | HEART RATE: 79 BPM | SYSTOLIC BLOOD PRESSURE: 156 MMHG | DIASTOLIC BLOOD PRESSURE: 86 MMHG

## 2024-09-30 VITALS — TEMPERATURE: 98.42 F

## 2024-09-30 VITALS
TEMPERATURE: 100.94 F | SYSTOLIC BLOOD PRESSURE: 148 MMHG | OXYGEN SATURATION: 100 % | HEART RATE: 104 BPM | DIASTOLIC BLOOD PRESSURE: 90 MMHG

## 2024-09-30 VITALS — BODY MASS INDEX: 38.7 KG/M2 | BODY MASS INDEX: 35.1 KG/M2

## 2024-09-30 DIAGNOSIS — Z79.52: ICD-10-CM

## 2024-09-30 DIAGNOSIS — K50.90: Primary | ICD-10-CM

## 2024-09-30 DIAGNOSIS — F31.9: ICD-10-CM

## 2024-09-30 DIAGNOSIS — Z88.8: ICD-10-CM

## 2024-09-30 DIAGNOSIS — Z88.0: ICD-10-CM

## 2024-09-30 DIAGNOSIS — Z79.899: ICD-10-CM

## 2024-09-30 DIAGNOSIS — Z88.6: ICD-10-CM

## 2024-09-30 DIAGNOSIS — E87.6: ICD-10-CM

## 2024-09-30 DIAGNOSIS — K21.9: ICD-10-CM

## 2024-09-30 DIAGNOSIS — Z88.5: ICD-10-CM

## 2024-09-30 LAB
ADD MANUAL DIFF: NO
ALANINE AMINOTRANSFERASE: 30 U/L (ref 14–59)
ALBUMIN GLOBULIN RATIO: 0.9
ALBUMIN LEVEL: 3.9 G/DL (ref 3.4–5)
ALKALINE PHOSPHATASE: 90 U/L (ref 46–116)
AMYLASE: 34 U/L (ref 25–115)
ANION GAP: 15.3
ASPARTATE AMINO TRANSFERASE: 18 U/L (ref 15–37)
BLOOD UREA NITROGEN: 6 MG/DL (ref 7–18)
CALCIUM: 9.6 MG/DL (ref 8.5–10.1)
CARBON DIOXIDE: 22.8 MMOL/L (ref 21–32)
CAST SEEN?: (no result) #/LPF
CHLORIDE: 100 MMOL/L (ref 98–107)
CO2 BLD-SCNC: 22.8 MMOL/L (ref 21–32)
ESTIMATED GFR (AFRICAN AMERICA: >60 (ref 60–?)
ESTIMATED GFR (NON-AFRICAN AME: >60 (ref 60–?)
GLOBULIN: 4.3 G/DL
GLUCOSE BLD-MCNC: 125 MG/DL (ref 74–106)
GLUCOSE URINE UA: 100 MG/DL
HCT VFR BLD CALC: 39.8 % (ref 36–48)
HEMATOCRIT: 39.8 % (ref 36–48)
HEMOGLOBIN: 13.3 G/DL (ref 12–16)
IMMATURE GRANULOCYTES ABS AUTO: 0.05 10^3/UL (ref 0–0.03)
IMMATURE GRANULOCYTES PCT AUTO: 0.4 % (ref 0–0.5)
LIPASE: 24 U/L (ref 16–77)
LYMPHOCYTES  ABSOLUTE AUTO: 2 10^3/UL (ref 1.2–3.8)
MCV RBC: 86.9 FL (ref 81–99)
MEAN CORPUSCULAR HEMOGLOBIN: 29 PG (ref 26.7–34)
MEAN CORPUSCULAR HGB CONC: 33.4 G/DL (ref 29.9–35.2)
MEAN CORPUSCULAR VOLUME: 86.9 FL (ref 81–99)
PLATELET # BLD: 404 10^3/UL (ref 150–450)
PLATELET COUNT: 404 10^3/UL (ref 150–450)
POTASSIUM SERPLBLD-SCNC: 3.1 MMOL/L (ref 3.5–5.1)
POTASSIUM: 3.1 MMOL/L (ref 3.5–5.1)
RED BLOOD COUNT: 4.58 10^6/UL (ref 4.2–5.4)
SODIUM BLD-SCNC: 135 MMOL/L (ref 136–145)
SODIUM: 135 MMOL/L (ref 136–145)
TOTAL PROTEIN: 8.2 G/DL (ref 6.4–8.2)
URINE CULTURE INDICATED: NO
WBC # BLD: 12.9 10^3/UL (ref 4–11)
WHITE BLOOD COUNT: 12.9 10^3/UL (ref 4–11)

## 2024-09-30 PROCEDURE — 96375 TX/PRO/DX INJ NEW DRUG ADDON: CPT

## 2024-09-30 PROCEDURE — 76705 ECHO EXAM OF ABDOMEN: CPT

## 2024-09-30 PROCEDURE — 96376 TX/PRO/DX INJ SAME DRUG ADON: CPT

## 2024-09-30 PROCEDURE — 96374 THER/PROPH/DIAG INJ IV PUSH: CPT

## 2024-09-30 PROCEDURE — 82150 ASSAY OF AMYLASE: CPT

## 2024-09-30 PROCEDURE — 78226 HEPATOBILIARY SYSTEM IMAGING: CPT

## 2024-09-30 PROCEDURE — 96361 HYDRATE IV INFUSION ADD-ON: CPT

## 2024-09-30 PROCEDURE — 36415 COLL VENOUS BLD VENIPUNCTURE: CPT

## 2024-09-30 PROCEDURE — 83690 ASSAY OF LIPASE: CPT

## 2024-09-30 PROCEDURE — 81001 URINALYSIS AUTO W/SCOPE: CPT

## 2024-09-30 PROCEDURE — 85025 COMPLETE CBC W/AUTO DIFF WBC: CPT

## 2024-09-30 PROCEDURE — 80048 BASIC METABOLIC PNL TOTAL CA: CPT

## 2024-09-30 PROCEDURE — 83735 ASSAY OF MAGNESIUM: CPT

## 2024-09-30 PROCEDURE — 94761 N-INVAS EAR/PLS OXIMETRY MLT: CPT

## 2024-09-30 PROCEDURE — 85027 COMPLETE CBC AUTOMATED: CPT

## 2024-09-30 PROCEDURE — 74177 CT ABD & PELVIS W/CONTRAST: CPT

## 2024-09-30 PROCEDURE — 80053 COMPREHEN METABOLIC PANEL: CPT

## 2024-09-30 PROCEDURE — A9537 TC99M MEBROFENIN: HCPCS

## 2024-09-30 PROCEDURE — 99285 EMERGENCY DEPT VISIT HI MDM: CPT

## 2024-09-30 PROCEDURE — 80076 HEPATIC FUNCTION PANEL: CPT

## 2024-09-30 RX ADMIN — METOCLOPRAMIDE 10 MG: 5 INJECTION, SOLUTION INTRAMUSCULAR; INTRAVENOUS at 21:25

## 2024-09-30 RX ADMIN — HYDROMORPHONE HYDROCHLORIDE 1 MG: 1 INJECTION, SOLUTION INTRAMUSCULAR; INTRAVENOUS; SUBCUTANEOUS at 19:05

## 2024-09-30 RX ADMIN — SODIUM CHLORIDE 125 ML: 900 INJECTION, SOLUTION INTRAVENOUS at 18:44

## 2024-09-30 RX ADMIN — ACETAMINOPHEN 650 MG: 650 SUPPOSITORY RECTAL at 19:06

## 2024-09-30 NOTE — CT_ITS
[address] [address] 
     [phone] 
  
  
Patient Name: 
JOSE ELIAS VILLANUEVA 
  
MRN: [id number]    YOB: 1985    Sex: F 
Assigned Patient Location: ER 
Current Patient Location:  
Accession/Order Number: [id number] 
Exam Date: 9/30/2024  20:08    Report Date: 9/30/2024  21:23 
  
At the request of: 
URIEL NOBLES   
  
Procedure:  CT abdomen pelvis w con 
  
Indication: Abdominal pain. History of Crohn's disease. 
  
Comparison: 1/28/2024 exam  
  
Procedure: Axial images were made from the diaphragms through the symphysis  
pubis. No oral contrast was given prior to scanning. 100 mL Omnipaque 300  
Intravenous contrast was given. 
  
Dose reduction techniques were achieved by using automated exposure control  
and/or adjustment of mA and/or kV according to patient size and/or use of  
iterative reconstruction technique. 
  
Findings: 
  
Liver/Biliary System: No liver masses. No intra or extrahepatic biliary  
dilatation. No gallstones or cholecystitis.  
  
Pancreas/Spleen: No evidence of acute pancreatitis. Pancreatic duct is not  
dilated. No pancreatic masses. No splenomegaly or splenic lesions.  
  
Kidneys/Adrenals: Normal symmetrical nephrograms. No renal masses. No renal or  
  
ureteral stones are seen. No hydronephrosis or hydroureter bilaterally. No  
adrenal nodules.  
  
Aorta/Vessels: No evidence of aortic aneurysm. Patent IVC, renal veins,  
hepatic  
veins and portal venous system.  
  
Bowel/Fluid/Nodes: No bowel dilatation or bowel wall thickening. No evidence  
of  
bowel obstruction. Normal appendix. No ascites or fluid collections. No  
adenopathy. 
  
Lung bases: Clear.  
  
Other findings: No aggressive osseous lesions are seen.  
  
Pelvis: No pelvic masses or adenopathy. No free fluid seen in the pelvis.  
Status post hysterectomy. Unremarkable bladder.  
  
Other Findings: No aggressive osseous lesions are seen. 
  
ORDER #: [id number] CT/CT abdomen pelvis w con  
Impression:  
   
No evidence of acute abdominal or pelvic process. No CT findings to explain   
patient's abdominal pain.  
   
   
Electronically authenticated by: RODRICK VASQUEZ   Date: 9/30/2024  21:23

## 2024-09-30 NOTE — ED.ABDPAIN1
HPI - Abdominal Pain
General
Stated Complaint: Abdominal Pain
Time Seen by Provider: 09/30/24 18:09
Source: patient
Mode of arrival: walk-in
History of Present Illness
HPI narrative: 
39-year-old female presents to the emergency department for a chief complaint of abdominal pain.  She has been having this for the past several weeks but she did not have a fever until today.  She did not realize that she had a fever.  She points to 
the middle part of her abdomen to indicate where most of the pain is but it is all over.  She still has her appendix and gallbladder and she was seen at another hospital and on an ultrasound she was told she had an enlarged gallbladder.
Related Data
Home Medications

?Medication ?Instructions ?Recorded ?Confirmed
dicyclomine 20 mg tablet 20 mg PO TID 06/01/23 06/03/23
promethazine 25 mg tablet 12.5 mg PO Q4H PRN nausea and 06/01/23 06/01/23
 vomiting  
butalbital-acetaminophen-caffeine 1 tab PO BID PRN headache 06/03/23 06/03/23
50 mg-325 mg-40 mg tablet   

Previous Rx's

?Medication ?Instructions ?Recorded
ondansetron 4 mg disintegrating 4 mg PO Q8H PRN nausea and 06/01/23
tablet vomiting 4 days #14 tabs 
promethazine 25 mg rectal 25 mg [place] Q6H PRN vomiting #12 ea 06/01/23
suppository  
hyoscyamine sulfate 0.125 mg 0.125 mg sublingual QID #30 tabs 06/03/23
sublingual tablet  
prednisone 20 mg tablet 20 mg PO TID #15 tabs 06/03/23


Allergies

Allergy/AdvReac Type Severity Reaction Status Date / Time
acetaminophen [From Tylenol] Allergy   Verified 06/01/23 09:49
dexlansoprazole Allergy   Verified 06/01/23 09:49
[From Dexilant]     
ibuprofen [From Motrin] Allergy   Verified 06/01/23 09:49
ketorolac [From Toradol] Allergy   Verified 06/01/23 09:49
lamotrigine [From Lamictal] Allergy   Verified 06/01/23 09:49
Penicillins Allergy   Verified 06/01/23 09:49
morphine AdvReac  Hives Verified 06/01/23 09:49



Review of Systems
ROS  
 Narrative A ten point review of systems is negative except as noted above.   

PFSH
PFSH
Surgical History (Updated 06/01/23 @ 23:46 by Elin Greene)

H/O: hysterectomy
 ?Z90.710 - Acquired absence of both cervix and uterus (ICD-10)


Social History
Little interest or pleasure in doing things:  not at all 
Feeling down, depressed, or hopeless:  not at all 



Exam
Narrative
Exam Narrative: 
Nurses note and vital signs reviewed and patient is not hypoxic.

General:  The patient appears well and in no apparent distress.  Patient is resting comfortably on cart.
Skin:  Warm, dry, no pallor noted.  There is no rash noted.
Head:  Normocephalic, atraumatic
Eye: Normal conjunctiva, no drainage
Ears, Nose, Mouth, and Throat: oral mucosa is moist. Nares patent. 
Cardiovascular:  Regular Rate and Rhythm
Respiratory:  Patient is in no distress, no accessory muscle use, lungs are clear to auscultation, no wheezing, rales or rhonchi
Back:  non-tender
GI: Soft and nondistended.  Diffuse tenderness present.
Musculoskeletal: The patient has no evidence of calf tenderness, no pitting edema, symmetrical pulses noted bilaterally
Neurological:  A&O, normal speech
Psychiatric:  Cooperative
Constitutional
Vital Signs, click to edit/add: 

Last Vital Signs

Temp  100.9 F H  09/30/24 18:06
Pulse  104 H  09/30/24 18:06
Resp  18   09/30/24 18:06
BP  148/90 H  09/30/24 18:06
Pulse Ox  100   09/30/24 18:06
O2 Del Method  Room Air  09/30/24 18:06




Course
Vital Signs
Vital signs: 

Vital Signs

Temperature  100.9 F H  09/30/24 18:06
Pulse Rate  104 H  09/30/24 18:06
Respiratory Rate  18   09/30/24 18:06
Blood Pressure  148/90 H  09/30/24 18:06
Pulse Oximetry  100   09/30/24 18:06
Oxygen Delivery Method  Room Air  09/30/24 18:06



Temperature  100.9 F H  09/30/24 18:06
Pulse Rate  104 H  09/30/24 18:06
Respiratory Rate  18   09/30/24 18:06
Blood Pressure  148/90 H  09/30/24 18:06
Pulse Oximetry  100   09/30/24 18:06
Oxygen Delivery Method  Room Air  09/30/24 18:06




MDM - Abdominal Pain
MDM Narrative
Medical decision making narrative: 
Tests are ordered as well as a request for the records from [facility] and the patient is signed out to Dr. Smalls at change of shift.
Differential Diagnosis
Differential diagnosis: Likely abdominal pain, acute appendicitis, constipation, diverticulitis, gastroenteritis and pancreatitis

Discharge Plan
Discharge
Clinical Impression:
 Abdominal pain


Patient Disposition: Still a Patient

Prescriptions / Home Meds:
No Action
  dicyclomine 20 mg tablet 
   20 mg PO TID 
   Rx Instructions:
   X 90 DAYS
  promethazine 25 mg tablet 
   12.5 mg PO Q4H PRN (Reason: nausea and vomiting) 
  promethazine 25 mg suppository 
   25 mg VT Q6H PRN (Reason: vomiting) Qty: 12 0RF
  ondansetron 4 mg tablet,disintegrating 
   4 mg PO Q8H PRN (Reason: nausea and vomiting) 4 Days Qty: 14 0RF
  hyoscyamine sulfate 0.125 mg Tablet, Sublingual 
   0.125 mg sublingual QID Qty: 30 0RF
  prednisone 20 mg tablet 
   20 mg PO TID Qty: 15 0RF
  butalbital-acetaminophen-caff -40 mg tablet 
   1 tab PO BID PRN (Reason: headache) 

Print Language: English

Referrals:
[facility] [Primary Care Provider] - 1 week

## 2024-09-30 NOTE — ED_ITS
HPI - Abdominal Pain    
General    
Stated Complaint: Abdominal Pain    
Time Seen by Provider: 09/30/24 18:09    
Source: patient    
Mode of arrival: walk-in    
History of Present Illness    
HPI narrative:     
39-year-old female presents to the emergency department for a chief complaint of  
abdominal pain.  She has been having this for the past several weeks but she did  
not have a fever until today.  She did not realize that she had a fever.  She   
points to the middle part of her abdomen to indicate where most of the pain is   
but it is all over.  She still has her appendix and gallbladder and she was seen  
at another hospital and on an ultrasound she was told she had an enlarged   
gallbladder.    
Related Data    
                                Home Medications    
    
    
    
?Medication ?Instructions ?Recorded ?Confirmed    
     
dicyclomine 20 mg tablet 20 mg PO TID 06/01/23 06/03/23    
     
promethazine 25 mg tablet 12.5 mg PO Q4H PRN nausea and 06/01/23 06/01/23    
    
 vomiting      
     
butalbital-acetaminophen-caffeine 1 tab PO BID PRN headache 06/03/23 06/03/23    
    
50 mg-325 mg-40 mg tablet       
    
    
                                  Previous Rx's    
    
    
    
?Medication ?Instructions ?Recorded    
     
ondansetron 4 mg disintegrating 4 mg PO Q8H PRN nausea and 06/01/23    
    
tablet vomiting 4 days #14 tabs     
     
promethazine 25 mg rectal 25 mg GA Q6H PRN vomiting #12 ea 06/01/23    
    
suppository      
     
hyoscyamine sulfate 0.125 mg 0.125 mg sublingual QID #30 tabs 06/03/23    
    
sublingual tablet      
     
prednisone 20 mg tablet 20 mg PO TID #15 tabs 06/03/23    
    
    
    
                                    Allergies    
    
    
    
Allergy/AdvReac Type Severity Reaction Status Date / Time    
     
acetaminophen [From Tylenol] Allergy   Verified 06/01/23 09:49    
     
dexlansoprazole Allergy   Verified 06/01/23 09:49    
    
[From Dexilant]         
     
ibuprofen [From Motrin] Allergy   Verified 06/01/23 09:49    
     
ketorolac [From Toradol] Allergy   Verified 06/01/23 09:49    
     
lamotrigine [From Lamictal] Allergy   Verified 06/01/23 09:49    
     
Penicillins Allergy   Verified 06/01/23 09:49    
     
morphine AdvReac  Hives Verified 06/01/23 09:49    
    
    
    
    
Review of Systems    
    
    
ROS      
    
 Narrative A ten point review of systems is negative except as noted above.       
    
    
PFSH    
PFSH    
Surgical History (Updated 06/01/23 @ 23:46 by Elin Greene)    
    
H/O: hysterectomy    
   ?Z90.710 - Acquired absence of both cervix and uterus (ICD-10)    
    
    
Social History    
Little interest or pleasure in doing things:  not at all     
Feeling down, depressed, or hopeless:  not at all     
    
    
    
Exam    
Narrative    
Exam Narrative:     
Nurses note and vital signs reviewed and patient is not hypoxic.    
    
General:  The patient appears well and in no apparent distress.  Patient is   
resting comfortably on cart.    
Skin:  Warm, dry, no pallor noted.  There is no rash noted.    
Head:  Normocephalic, atraumatic    
Eye: Normal conjunctiva, no drainage    
Ears, Nose, Mouth, and Throat: oral mucosa is moist. Nares patent.     
Cardiovascular:  Regular Rate and Rhythm    
Respiratory:  Patient is in no distress, no accessory muscle use, lungs are   
clear to auscultation, no wheezing, rales or rhonchi    
Back:  non-tender    
GI: Soft and nondistended.  Diffuse tenderness present.    
Musculoskeletal: The patient has no evidence of calf tenderness, no pitting   
edema, symmetrical pulses noted bilaterally    
Neurological:  A&O, normal speech    
Psychiatric:  Cooperative    
Constitutional    
Vital Signs, click to edit/add:     
    
                                Last Vital Signs    
    
    
    
Temp  100.9 F H  09/30/24 18:06    
     
Pulse  104 H  09/30/24 18:06    
     
Resp  18   09/30/24 18:06    
     
BP  148/90 H  09/30/24 18:06    
     
Pulse Ox  100   09/30/24 18:06    
     
O2 Del Method  Room Air  09/30/24 18:06    
    
    
    
    
    
Course    
Vital Signs    
Vital signs:     
    
                                   Vital Signs    
    
    
    
Temperature  100.9 F H  09/30/24 18:06    
     
Pulse Rate  104 H  09/30/24 18:06    
     
Respiratory Rate  18   09/30/24 18:06    
     
Blood Pressure  148/90 H  09/30/24 18:06    
     
Pulse Oximetry  100   09/30/24 18:06    
     
Oxygen Delivery Method  Room Air  09/30/24 18:06    
    
    
                                            
    
    
    
Temperature  100.9 F H  09/30/24 18:06    
     
Pulse Rate  104 H  09/30/24 18:06    
     
Respiratory Rate  18   09/30/24 18:06    
     
Blood Pressure  148/90 H  09/30/24 18:06    
     
Pulse Oximetry  100   09/30/24 18:06    
     
Oxygen Delivery Method  Room Air  09/30/24 18:06    
    
    
    
    
    
MDM - Abdominal Pain    
MDM Narrative    
Medical decision making narrative:     
Tests are ordered as well as a request for the records from [facility]   
and the patient is signed out to Dr. Smalls at change of shift.    
Differential Diagnosis    
Differential diagnosis: Likely abdominal pain, acute appendicitis, constipation,  
diverticulitis, gastroenteritis and pancreatitis    
    
Discharge Plan    
Discharge    
Clinical Impression:    
 Abdominal pain    
    
    
Patient Disposition: Still a Patient    
    
Prescriptions / Home Meds:    
No Action    
  dicyclomine 20 mg tablet     
   20 mg PO TID     
   Rx Instructions:    
   X 90 DAYS    
  promethazine 25 mg tablet     
   12.5 mg PO Q4H PRN (Reason: nausea and vomiting)     
  promethazine 25 mg suppository     
   25 mg GA Q6H PRN (Reason: vomiting) Qty: 12 0RF    
  ondansetron 4 mg tablet,disintegrating     
   4 mg PO Q8H PRN (Reason: nausea and vomiting) 4 Days Qty: 14 0RF    
  hyoscyamine sulfate 0.125 mg Tablet, Sublingual     
   0.125 mg sublingual QID Qty: 30 0RF    
  prednisone 20 mg tablet     
   20 mg PO TID Qty: 15 0RF    
  butalbital-acetaminophen-caff -40 mg tablet     
   1 tab PO BID PRN (Reason: headache)     
    
Print Language: English    
    
Referrals:    
[facility] [Primary Care Provider] - 1 week

## 2024-10-01 VITALS — HEART RATE: 90 BPM | OXYGEN SATURATION: 98 %

## 2024-10-01 VITALS
TEMPERATURE: 98.42 F | SYSTOLIC BLOOD PRESSURE: 117 MMHG | DIASTOLIC BLOOD PRESSURE: 72 MMHG | OXYGEN SATURATION: 96 % | HEART RATE: 101 BPM

## 2024-10-01 VITALS
TEMPERATURE: 98.96 F | DIASTOLIC BLOOD PRESSURE: 79 MMHG | HEART RATE: 86 BPM | SYSTOLIC BLOOD PRESSURE: 129 MMHG | OXYGEN SATURATION: 98 %

## 2024-10-01 VITALS
OXYGEN SATURATION: 98 % | DIASTOLIC BLOOD PRESSURE: 95 MMHG | TEMPERATURE: 98.24 F | SYSTOLIC BLOOD PRESSURE: 144 MMHG | HEART RATE: 119 BPM

## 2024-10-01 VITALS — TEMPERATURE: 100.9 F

## 2024-10-01 VITALS
TEMPERATURE: 97.6 F | DIASTOLIC BLOOD PRESSURE: 78 MMHG | SYSTOLIC BLOOD PRESSURE: 145 MMHG | HEART RATE: 97 BPM | OXYGEN SATURATION: 99 %

## 2024-10-01 VITALS — OXYGEN SATURATION: 99 %

## 2024-10-01 VITALS — OXYGEN SATURATION: 96 %

## 2024-10-01 VITALS — TEMPERATURE: 100.94 F

## 2024-10-01 LAB
ANION GAP: 13.2
BLOOD UREA NITROGEN: 3 MG/DL (ref 7–18)
CALCIUM: 9.3 MG/DL (ref 8.5–10.1)
CARBON DIOXIDE: 24.9 MMOL/L (ref 21–32)
CHLORIDE: 100 MMOL/L (ref 98–107)
CO2 BLD-SCNC: 24.9 MMOL/L (ref 21–32)
ESTIMATED GFR (AFRICAN AMERICA: >60 (ref 60–?)
ESTIMATED GFR (NON-AFRICAN AME: >60 (ref 60–?)
GLUCOSE BLD-MCNC: 121 MG/DL (ref 74–106)
HCT VFR BLD CALC: 37.8 % (ref 36–48)
HEMATOCRIT: 37.8 % (ref 36–48)
HEMOGLOBIN: 12.6 G/DL (ref 12–16)
MAGNESIUM: 1.9 MG/DL (ref 1.8–2.4)
MCV RBC: 85.9 FL (ref 81–99)
MEAN CORPUSCULAR HEMOGLOBIN: 28.6 PG (ref 26.7–34)
MEAN CORPUSCULAR HGB CONC: 33.3 G/DL (ref 29.9–35.2)
MEAN CORPUSCULAR VOLUME: 85.9 FL (ref 81–99)
PLATELET # BLD: 390 10^3/UL (ref 150–450)
PLATELET COUNT: 390 10^3/UL (ref 150–450)
POTASSIUM SERPLBLD-SCNC: 3.1 MMOL/L (ref 3.5–5.1)
POTASSIUM: 3.1 MMOL/L (ref 3.5–5.1)
RED BLOOD COUNT: 4.4 10^6/UL (ref 4.2–5.4)
SODIUM BLD-SCNC: 135 MMOL/L (ref 136–145)
SODIUM: 135 MMOL/L (ref 136–145)
WBC # BLD: 10.2 10^3/UL (ref 4–11)
WHITE BLOOD COUNT: 10.2 10^3/UL (ref 4–11)

## 2024-10-01 RX ADMIN — PANTOPRAZOLE SODIUM 40 MG: 40 INJECTION, POWDER, FOR SOLUTION INTRAVENOUS at 14:06

## 2024-10-01 RX ADMIN — TEMAZEPAM 15 MG: 15 CAPSULE ORAL at 23:40

## 2024-10-01 RX ADMIN — DICYCLOMINE HYDROCHLORIDE 20 MG: 10 CAPSULE ORAL at 05:21

## 2024-10-01 RX ADMIN — POTASSIUM CHLORIDE 67.5 MEQ: 149 INJECTION, SOLUTION, CONCENTRATE INTRAVENOUS at 14:06

## 2024-10-01 RX ADMIN — METHYLPREDNISOLONE SODIUM SUCCINATE 40 MG: 40 INJECTION, POWDER, FOR SOLUTION INTRAMUSCULAR; INTRAVENOUS at 21:59

## 2024-10-01 RX ADMIN — DICYCLOMINE HYDROCHLORIDE 20 MG: 10 CAPSULE ORAL at 14:06

## 2024-10-01 RX ADMIN — DICYCLOMINE HYDROCHLORIDE 20 MG: 10 CAPSULE ORAL at 21:59

## 2024-10-01 RX ADMIN — OXYCODONE 5 MG: 5 TABLET ORAL at 17:09

## 2024-10-01 RX ADMIN — CIPROFLOXACIN 200 MG: 2 INJECTION, SOLUTION INTRAVENOUS at 23:09

## 2024-10-01 RX ADMIN — CIPROFLOXACIN 200 MG: 2 INJECTION, SOLUTION INTRAVENOUS at 11:21

## 2024-10-01 RX ADMIN — METRONIDAZOLE 100 MG: 500 INJECTION, SOLUTION INTRAVENOUS at 11:21

## 2024-10-01 RX ADMIN — OXYCODONE 5 MG: 5 TABLET ORAL at 11:21

## 2024-10-01 RX ADMIN — POTASSIUM CHLORIDE 40 MEQ: 750 TABLET, EXTENDED RELEASE ORAL at 07:40

## 2024-10-01 RX ADMIN — HYDROMORPHONE HYDROCHLORIDE 1 MG: 1 INJECTION, SOLUTION INTRAMUSCULAR; INTRAVENOUS; SUBCUTANEOUS at 01:44

## 2024-10-01 RX ADMIN — METRONIDAZOLE 100 MG: 500 INJECTION, SOLUTION INTRAVENOUS at 21:58

## 2024-10-01 RX ADMIN — METHYLPREDNISOLONE SODIUM SUCCINATE 125 MG: 125 INJECTION, POWDER, FOR SOLUTION INTRAMUSCULAR; INTRAVENOUS at 11:21

## 2024-10-01 RX ADMIN — ACETAMINOPHEN 400 MG: 10 INJECTION, SOLUTION INTRAVENOUS at 02:08
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
## 2024-10-01 NOTE — CM.NOTE
Rounds made with Dr. Al, discussed with pt need for hida scan and ultrasound R upper quadrant.  Pt continues with abdominal pain to R side of abdomen.  Pt continues to c/o vomiting after ingesting food.  Pt is OBS status, Dr. Al will 
reassess pt after further testing.
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
## 2024-10-01 NOTE — SWNOTE1
KHRIS printed off a list of transportation services in [facility] that is from the [employer]. SW provided this to patient and highlighted all services in [facility]. KHRIS advised pt to let SW know if she has any further questions.

## 2024-10-01 NOTE — US_ITS
[address] [address] 
     [phone] 
  
  
Patient Name: 
JOSE ELIAS VILLANUEVA 
  
MRN: [id number]    YOB: 1985    Sex: F 
[place] Location: [place] 
Current Patient Location: [place] 
Accession/Order Number: [id number] 
Exam Date: 10/01/2024  10:30    Report Date: 10/01/2024  11:02 
  
At the request of: 
SHAIKH JESIKA   
  
Procedure:  US right upper quadrant 
  
EXAM: US right upper quadrant  
  
HISTORY: RUQ pain 
  
COMPARISON: 6/1/2023  
  
TECHNIQUE: Transabdominal images 
  
FINDINGS:  
  
The liver is normal in size, contour and echotexture measuring 19.2 cm in  
length. No focal hepatic mass. Hepatopedal flow in the main portal vein with a  
  
velocity of 35 cm/s. 
  
The gallbladder is normal in size. The wall measures 1.2 mm. The common bile  
duct measures 1.6 mm. No pericholecystic fluid or cholelithiasis. Positive  
sonographic Tucker sign. 
  
The visualized pancreatic body is normal 
  
The right kidney is normal measuring 10.7 x 5.1 x 4.6 cm 
  
ORDER #: [id number] US/US right upper quadrant  
IMPRESSION:   
   
Positive sonographic Tucker sign with normal appearance of the gallbladder  
   
   
Electronically authenticated by: THIAGO PITTS   Date: 10/01/2024  11:02
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
## 2024-10-02 VITALS
OXYGEN SATURATION: 98 % | SYSTOLIC BLOOD PRESSURE: 140 MMHG | HEART RATE: 83 BPM | DIASTOLIC BLOOD PRESSURE: 83 MMHG | TEMPERATURE: 97.88 F

## 2024-10-02 VITALS
SYSTOLIC BLOOD PRESSURE: 158 MMHG | OXYGEN SATURATION: 98 % | DIASTOLIC BLOOD PRESSURE: 94 MMHG | HEART RATE: 90 BPM | TEMPERATURE: 99.14 F

## 2024-10-02 VITALS — OXYGEN SATURATION: 100 %

## 2024-10-02 VITALS
SYSTOLIC BLOOD PRESSURE: 164 MMHG | HEART RATE: 96 BPM | TEMPERATURE: 98.78 F | DIASTOLIC BLOOD PRESSURE: 102 MMHG | OXYGEN SATURATION: 95 %

## 2024-10-02 VITALS — OXYGEN SATURATION: 97 %

## 2024-10-02 VITALS — HEART RATE: 80 BPM

## 2024-10-02 LAB
ADD MANUAL DIFF: NO
ALANINE AMINOTRANSFERASE: 18 U/L (ref 14–59)
ALBUMIN GLOBULIN RATIO: 0.8
ALBUMIN LEVEL: 3.4 G/DL (ref 3.4–5)
ALKALINE PHOSPHATASE: 79 U/L (ref 46–116)
ANION GAP: 12.9
ASPARTATE AMINO TRANSFERASE: 10 U/L (ref 15–37)
BLOOD UREA NITROGEN: 6 MG/DL (ref 7–18)
CALCIUM: 10.2 MG/DL (ref 8.5–10.1)
CARBON DIOXIDE: 22.7 MMOL/L (ref 21–32)
CHLORIDE: 103 MMOL/L (ref 98–107)
CO2 BLD-SCNC: 22.7 MMOL/L (ref 21–32)
ESTIMATED GFR (AFRICAN AMERICA: >60
ESTIMATED GFR (NON-AFRICAN AME: >60
GLOBULIN: 4.2 G/DL
GLUCOSE BLD-MCNC: 153 MG/DL (ref 74–106)
HCT VFR BLD CALC: 37 % (ref 36–48)
HEMATOCRIT: 37 % (ref 36–48)
HEMOGLOBIN: 12.6 G/DL (ref 12–16)
IMMATURE GRANULOCYTES ABS AUTO: 0.04 10^3/UL (ref 0–0.03)
IMMATURE GRANULOCYTES PCT AUTO: 0.3 % (ref 0–0.5)
LYMPHOCYTES  ABSOLUTE AUTO: 1.8 10^3/UL (ref 1.2–3.8)
MCV RBC: 85.3 FL (ref 81–99)
MEAN CORPUSCULAR HEMOGLOBIN: 29 PG (ref 26.7–34)
MEAN CORPUSCULAR HGB CONC: 34.1 G/DL (ref 29.9–35.2)
MEAN CORPUSCULAR VOLUME: 85.3 FL (ref 81–99)
PLATELET # BLD: 406 10^3/UL (ref 150–450)
PLATELET COUNT: 406 10^3/UL (ref 150–450)
POTASSIUM SERPLBLD-SCNC: 3.6 MMOL/L (ref 3.5–5.1)
POTASSIUM: 3.6 MMOL/L (ref 3.5–5.1)
RED BLOOD COUNT: 4.34 10^6/UL (ref 4.2–5.4)
SODIUM BLD-SCNC: 135 MMOL/L (ref 136–145)
SODIUM: 135 MMOL/L (ref 136–145)
TOTAL PROTEIN: 7.6 G/DL (ref 6.4–8.2)
WBC # BLD: 12.9 10^3/UL (ref 4–11)
WHITE BLOOD COUNT: 12.9 10^3/UL (ref 4–11)

## 2024-10-02 RX ADMIN — TEMAZEPAM 15 MG: 15 CAPSULE ORAL at 22:39

## 2024-10-02 RX ADMIN — DICYCLOMINE HYDROCHLORIDE 20 MG: 10 CAPSULE ORAL at 13:03

## 2024-10-02 RX ADMIN — METRONIDAZOLE 100 MG: 500 INJECTION, SOLUTION INTRAVENOUS at 12:52

## 2024-10-02 RX ADMIN — CIPROFLOXACIN 200 MG: 2 INJECTION, SOLUTION INTRAVENOUS at 22:39

## 2024-10-02 RX ADMIN — CIPROFLOXACIN 200 MG: 2 INJECTION, SOLUTION INTRAVENOUS at 11:14

## 2024-10-02 RX ADMIN — DICYCLOMINE HYDROCHLORIDE 20 MG: 10 CAPSULE ORAL at 21:45

## 2024-10-02 RX ADMIN — PANTOPRAZOLE SODIUM 40 MG: 40 INJECTION, POWDER, FOR SOLUTION INTRAVENOUS at 13:03

## 2024-10-02 RX ADMIN — OXYCODONE 5 MG: 5 TABLET ORAL at 08:43

## 2024-10-02 RX ADMIN — OXYCODONE 5 MG: 5 TABLET ORAL at 16:08

## 2024-10-02 RX ADMIN — OXYCODONE 5 MG: 5 TABLET ORAL at 21:45

## 2024-10-02 RX ADMIN — METRONIDAZOLE 100 MG: 500 INJECTION, SOLUTION INTRAVENOUS at 04:32

## 2024-10-02 NOTE — CM.NOTE
Dr. Al spoke with Dr. Berkowitz regarding pt plan of care and will change to inpatient status today.

## 2024-10-02 NOTE — P.GSPN_ITS
Progress Note: A&P    
Assessment and Plan    
(1) Exacerbation of Crohn's disease:     
      Qualifiers:    
        Digestive disease complication type: without complication  Qualified   
Code(s): K50.90 - Crohn's disease, unspecified, without complications    
(2) Abdominal pain:     
      Assessment and Plan:     
US and HIDA with no abnormalities    
Recommend for patient to follow up with her GI provider and consider restarting   
maintenance medications for her Crohns disease, defer any need EGD and c-scope   
to her GI provider she is seeing later this month      
contact general surgery as needed, thanks    
      Qualifiers:    
        Abdominal location: generalized  Qualified Code(s): R10.84 - Generalized  
abdominal pain    
(3) Bipolar 1 disorder:     
(4) Hypokalemia:     
(5) Crohn's disease:    
    
Exam    
Constitutional    
Vital Signs, click to edit/add:     
                                Last Vital Signs    
    
    
    
Temp  98.7 F   10/02/24 04:40    
     
Pulse  96 H  10/02/24 04:40    
     
Resp  18   10/02/24 04:40    
     
BP  164/102 H  10/02/24 04:40    
     
Pulse Ox  95   10/02/24 04:40    
     
O2 Del Method  Room Air  10/02/24 04:40

## 2024-10-02 NOTE — NM_ITS
The [address] [address] 
     [phone] 
  
  
Patient Name: 
JOSE ELIAS VILLANUEVA 
  
MRN: [id number]    YOB: 1985    Sex: F 
Ass[facility] Patient Location: [facility] 
Current Patient Location: [facility] 
Accession/Order Number: [id number] 
Exam Date: 10/02/2024  06:35    Report Date: 10/02/2024  08:33 
  
At the request of: 
SHAIKH JESIKA   
  
Procedure:  NM hepatobiliary wo pharm 
  
EXAMINATION: NM hepatobiliary wo pharm  
  
HISTORY: RUQ pain, possible gall bladder pathology 
  
COMPARISON: No relevant comparison available.  
  
TECHNIQUE: Radionuclide hepatobiliary imaging was performed after intravenous  
injection of Tc-99m TESSA derivative with sequential acquisitions every 1 minute  
  
for one hour. The patient declined gallbladder ejection fraction analysis 
  
FINDINGS: 
  
LIVER: Normal, prompt and uniform radiotracer uptake and clearing. 
BILIARY DUCTS: Normal radioisotopic biliary excretion.  
GALLBLADDER: Normal with no evidence of cystic duct obstruction.  
INTESTINE: Normal with no evidence of common biliary ductal obstruction.  
EJECTION FRACTION: Not performed 
OTHER: Negative.  
  
ORDER #: [id number] NM/NM hepatobiliary wo pharm  
IMPRESSION:   
   
No abnormality observed  
   
   
Electronically authenticated by: THIAGO PITTS   Date: 10/02/2024  08:33

## 2024-10-02 NOTE — PM.IMPN1
Progress Note: A&P
Assessment and Plan
(1) Exacerbation of Crohn's disease: 
      Assessment and Plan: 
Patient was initially refusing IV Solu-Medrol.  But she is now agreeable.  Continue with IV Solu-Medrol.  She has not been on any medication for over 2 years now.  Continue with IV Cipro/Flagyl.  No improvement.  Continue with supportive care.  
Slowly advance diet as tolerated.
      Qualifiers:
        Digestive disease complication type: without complication  Qualified Code(s): K50.90 - Crohn's disease, unspecified, without complications
(2) Abdominal pain: 
      Assessment and Plan: 
Patient was initially being worked up for possible acalculous cholecystitis.  She has normal ultrasound and no evidence of gallbladder dysfunction on HIDA scan.  Her abdominal pain is a little bit better.  However she is still nauseous and vomiting. 
 Continue with supportive care.  Continue with IV Solu-Medrol and IV antibiotic.
      Qualifiers:
        Abdominal location: generalized  Qualified Code(s): R10.84 - Generalized abdominal pain
(3) Bipolar 1 disorder: 
      Assessment and Plan: 
Mood is stable.  Continue with home med
(4) Hypokalemia: 
      Assessment and Plan: 
Monitor and replete as needed

Plan
Patient initially admitted for observation but now switched to inpatient as she has failed to improve clinically after initial.  Of observation.  She is a still nauseous unable to tolerate p.o. diet and has had multiple episodes of vomiting 
throughout the day.  Her pain is slightly better but she is still not able to tolerate p.o. diet and hold require continued IV antibiotics, IV steroids and supportive care while in the hospital until her symptoms improve and she is able to tolerate 
diet.

Internal Medicine - PN: Subj
Subjective
Interval history: 
Seen and examined.  She reports her abdominal pain is a little bit better but she continues to be nauseous and had multiple episodes of emesis throughout the day on overnight.  No significant improvement.

Exam
Constitutional
Vital Signs, click to edit/add: 
Last Vital Signs

Temp  98.7 F   10/02/24 04:40
Pulse  96 H  10/02/24 04:40
Resp  18   10/02/24 04:40
BP  164/102 H  10/02/24 04:40
Pulse Ox  97   10/02/24 10:25
O2 Del Method  Room Air  10/02/24 10:25


Documenting provider has reviewed patient's vital signs: yes
Common normals: no apparent distress and oriented x3
General appearance: cooperative
Eye
Common normals: conjunctivae normal and no scleral icterus
Conjunctiva: conjunctiva(e) normal
Respiratory
Common normals: normal respiratory effort and clear to auscultation bilaterally
Effort & inspection: able to speak in complete sentences
Auscultation: clear to auscultation bilaterally
Cardio
Common normals: regular rate, S1 normal heart sound and S2 normal heart sound
Rate: regular rate
Heart sounds: S1 normal and S2 normal
GI
Common normals: Normal to inspection, nondistended, normoactive bowel sounds present, soft to palpation and no hepatosplenomegaly
Palpation: soft, tender (Generalized abdominal pain but tenderness is most severe in RUQ) Details: RUQ and no hepatosplenomegaly
Extremity
Common normals: no clubbing, cyanosis or edema
Neuro
Common normals: oriented x3, moves all extremities and no focal motor deficits
Psych
Common normals: mental status grossly normal, denies hallucinations, denies homicidal ideation and denies suicidal ideation

Internal Medicine - PN: Obj Da
Labs
Labs: 
Laboratory Results - last 24 hr

  10/02/24
  06:25
WBC  12.9 H
RBC  4.34
Hgb  12.6
Hct  37.0
MCV  85.3
MCH  29.0
MCHC  34.1
RDW  12.9
Plt Count  406
MPV  9.3 L
Neut % (Auto)  83.2 H
Lymph % (Auto)  13.8 L
Mono % (Auto)  2.6
Eos % (Auto)  0.0 L
Baso % (Auto)  0.1 L
Neut # (Auto)  10.7 H
Lymph # (Auto)  1.8
Mono # (Auto)  0.3
Eos # (Auto)  0.0
Baso # (Auto)  0.0
Abs Immat Gran (auto)  0.04 H
Imm/Tot Granulo (auto)  0.3
Sodium  135 L
Potassium  3.6
Chloride  103
Carbon Dioxide  22.7
Anion Gap  12.9
BUN  6.0 L
Creatinine  0.87
Est GFR (African Amer)  >60
Est GFR (Non-Af Amer)  >60
BUN/Creatinine Ratio  6.9
Glucose  153 H
Calcium  10.2 H
Total Bilirubin  0.3
AST  10 L
ALT  18
Alkaline Phosphatase  79
Total Protein  7.6
Albumin  3.4
Globulin  4.2
Albumin/Globulin Ratio  0.8

## 2024-10-02 NOTE — CM.NOTE
Rounds made with Dr. Al, pt unable to tolerate P.O intake.  Pt continues with nausea and vomiting.  Dr. Al will reach out to Dr. Berkowitz to determine plan of care for pt.

## 2024-10-02 NOTE — P.IMPN_ITS
Progress Note: A&P    
Assessment and Plan    
(1) Exacerbation of Crohn's disease:     
      Assessment and Plan:     
Patient was initially refusing IV Solu-Medrol.  But she is now agreeable.    
Continue with IV Solu-Medrol.  She has not been on any medication for over 2   
years now.  Continue with IV Cipro/Flagyl.  No improvement.  Continue with   
supportive care.  Slowly advance diet as tolerated.    
      Qualifiers:    
        Digestive disease complication type: without complication  Qualified   
Code(s): K50.90 - Crohn's disease, unspecified, without complications    
(2) Abdominal pain:     
      Assessment and Plan:     
Patient was initially being worked up for possible acalculous cholecystitis.    
She has normal ultrasound and no evidence of gallbladder dysfunction on HIDA   
scan.  Her abdominal pain is a little bit better.  However she is still nauseous  
and vomiting.  Continue with supportive care.  Continue with IV Solu-Medrol and   
IV antibiotic.    
      Qualifiers:    
        Abdominal location: generalized  Qualified Code(s): R10.84 - Generalized  
abdominal pain    
(3) Bipolar 1 disorder:     
      Assessment and Plan:     
Mood is stable.  Continue with home med    
(4) Hypokalemia:     
      Assessment and Plan:     
Monitor and replete as needed    
    
Plan    
Patient initially admitted for observation but now switched to inpatient as she   
has failed to improve clinically after initial.  Of observation.  She is a still  
nauseous unable to tolerate p.o. diet and has had multiple episodes of vomiting   
throughout the day.  Her pain is slightly better but she is still not able to   
tolerate p.o. diet and hold require continued IV antibiotics, IV steroids and   
supportive care while in the hospital until her symptoms improve and she is able  
to tolerate diet.    
    
Internal Medicine - PN: Subj    
Subjective    
Interval history:     
Seen and examined.  She reports her abdominal pain is a little bit better but   
she continues to be nauseous and had multiple episodes of emesis throughout the   
day on overnight.  No significant improvement.    
    
Exam    
Constitutional    
Vital Signs, click to edit/add:     
                                Last Vital Signs    
    
    
    
Temp  98.7 F   10/02/24 04:40    
     
Pulse  96 H  10/02/24 04:40    
     
Resp  18   10/02/24 04:40    
     
BP  164/102 H  10/02/24 04:40    
     
Pulse Ox  97   10/02/24 10:25    
     
O2 Del Method  Room Air  10/02/24 10:25    
    
    
    
Documenting provider has reviewed patient's vital signs: yes    
Common normals: no apparent distress and oriented x3    
General appearance: cooperative    
Eye    
Common normals: conjunctivae normal and no scleral icterus    
Conjunctiva: conjunctiva(e) normal    
Respiratory    
Common normals: normal respiratory effort and clear to auscultation bilaterally    
Effort & inspection: able to speak in complete sentences    
Auscultation: clear to auscultation bilaterally    
Cardio    
Common normals: regular rate, S1 normal heart sound and S2 normal heart sound    
Rate: regular rate    
Heart sounds: S1 normal and S2 normal    
GI    
Common normals: Normal to inspection, nondistended, normoactive bowel sounds   
present, soft to palpation and no hepatosplenomegaly    
Palpation: soft, tender (Generalized abdominal pain but tenderness is most se  
ty in RUQ) Details: RUQ and no hepatosplenomegaly    
Extremity    
Common normals: no clubbing, cyanosis or edema    
Neuro    
Common normals: oriented x3, moves all extremities and no focal motor deficits    
Psych    
Common normals: mental status grossly normal, denies hallucinations, denies   
homicidal ideation and denies suicidal ideation    
    
Internal Medicine - PN: Obj Da    
Labs    
Labs:     
                         Laboratory Results - last 24 hr    
    
    
    
  10/02/24    
    
  06:25    
     
WBC  12.9 H    
     
RBC  4.34    
     
Hgb  12.6    
     
Hct  37.0    
     
MCV  85.3    
     
MCH  29.0    
     
MCHC  34.1    
     
RDW  12.9    
     
Plt Count  406    
     
MPV  9.3 L    
     
Neut % (Auto)  83.2 H    
     
Lymph % (Auto)  13.8 L    
     
Mono % (Auto)  2.6    
     
Eos % (Auto)  0.0 L    
     
Baso % (Auto)  0.1 L    
     
Neut # (Auto)  10.7 H    
     
Lymph # (Auto)  1.8    
     
Mono # (Auto)  0.3    
     
Eos # (Auto)  0.0    
     
Baso # (Auto)  0.0    
     
Abs Immat Gran (auto)  0.04 H    
     
Imm/Tot Granulo (auto)  0.3    
     
Sodium  135 L    
     
Potassium  3.6    
     
Chloride  103    
     
Carbon Dioxide  22.7    
     
Anion Gap  12.9    
     
BUN  6.0 L    
     
Creatinine  0.87    
     
Est GFR ( Amer)  >60    
     
Est GFR (Non-Af Amer)  >60    
     
BUN/Creatinine Ratio  6.9    
     
Glucose  153 H    
     
Calcium  10.2 H    
     
Total Bilirubin  0.3    
     
AST  10 L    
     
ALT  18    
     
Alkaline Phosphatase  79    
     
Total Protein  7.6    
     
Albumin  3.4    
     
Globulin  4.2    
     
Albumin/Globulin Ratio  0.8

## 2024-10-02 NOTE — PC.NURSE
Patient c/o headache 10/10 since solumedrol last given. Patient had understanding she could not have pain meds d/t scan at 0600. /102. Pulse 96 Spo2 95%. Ice pack given to apply to head  and Physician will be notified.

## 2024-10-03 VITALS
TEMPERATURE: 98.1 F | OXYGEN SATURATION: 97 % | HEART RATE: 97 BPM | DIASTOLIC BLOOD PRESSURE: 95 MMHG | SYSTOLIC BLOOD PRESSURE: 133 MMHG

## 2024-10-03 VITALS — OXYGEN SATURATION: 97 %

## 2024-10-03 VITALS — OXYGEN SATURATION: 95 %

## 2024-10-03 LAB
ADD MANUAL DIFF: NO
ALANINE AMINOTRANSFERASE: 21 U/L (ref 14–59)
ALBUMIN GLOBULIN RATIO: 0.9
ALBUMIN LEVEL: 3.2 G/DL (ref 3.4–5)
ALKALINE PHOSPHATASE: 67 U/L (ref 46–116)
ANION GAP: 15.6
ASPARTATE AMINO TRANSFERASE: 10 U/L (ref 15–37)
BLOOD UREA NITROGEN: 4 MG/DL (ref 7–18)
CALCIUM: 9.3 MG/DL (ref 8.5–10.1)
CARBON DIOXIDE: 24.6 MMOL/L (ref 21–32)
CHLORIDE: 102 MMOL/L (ref 98–107)
CO2 BLD-SCNC: 24.6 MMOL/L (ref 21–32)
ESTIMATED GFR (AFRICAN AMERICA: >60
ESTIMATED GFR (NON-AFRICAN AME: 56
GLOBULIN: 3.4 G/DL
GLUCOSE BLD-MCNC: 108 MG/DL (ref 74–106)
HCT VFR BLD CALC: 36.1 % (ref 36–48)
HEMATOCRIT: 36.1 % (ref 36–48)
HEMOGLOBIN: 12 G/DL (ref 12–16)
IMMATURE GRANULOCYTES ABS AUTO: 0.03 10^3/UL (ref 0–0.03)
IMMATURE GRANULOCYTES PCT AUTO: 0.3 % (ref 0–0.5)
LYMPHOCYTES  ABSOLUTE AUTO: 4.3 10^3/UL (ref 1.2–3.8)
MCV RBC: 87.4 FL (ref 81–99)
MEAN CORPUSCULAR HEMOGLOBIN: 29.1 PG (ref 26.7–34)
MEAN CORPUSCULAR HGB CONC: 33.2 G/DL (ref 29.9–35.2)
MEAN CORPUSCULAR VOLUME: 87.4 FL (ref 81–99)
PLATELET # BLD: 329 10^3/UL (ref 150–450)
PLATELET COUNT: 329 10^3/UL (ref 150–450)
POTASSIUM SERPLBLD-SCNC: 3.2 MMOL/L (ref 3.5–5.1)
POTASSIUM: 3.2 MMOL/L (ref 3.5–5.1)
RED BLOOD COUNT: 4.13 10^6/UL (ref 4.2–5.4)
SODIUM BLD-SCNC: 139 MMOL/L (ref 136–145)
SODIUM: 139 MMOL/L (ref 136–145)
TOTAL PROTEIN: 6.6 G/DL (ref 6.4–8.2)
WBC # BLD: 10.3 10^3/UL (ref 4–11)
WHITE BLOOD COUNT: 10.3 10^3/UL (ref 4–11)

## 2024-10-03 RX ADMIN — OXYCODONE 5 MG: 5 TABLET ORAL at 03:55

## 2024-10-03 RX ADMIN — METRONIDAZOLE 100 MG: 500 INJECTION, SOLUTION INTRAVENOUS at 03:55

## 2024-10-03 RX ADMIN — DICYCLOMINE HYDROCHLORIDE 20 MG: 10 CAPSULE ORAL at 06:28

## 2024-10-03 RX ADMIN — ENOXAPARIN SODIUM 40 MG: 40 INJECTION SUBCUTANEOUS at 08:47

## 2024-10-03 NOTE — CM.NOTE
Rounds made with Dr. Al. Shani still with tenderness but feels better and would like to go home. Dr. Al discussed the need for Shani to see a GI specialist. Shani states she already has an appt with a GI specialist at [facility] on 
10/14/24.  Plan is for Shani to be discharged home, if tolerates diet, on prednisone,antibiotic & nausea med.  Shani verbalized understanding.

## 2024-10-03 NOTE — SWNOTE1
Pt is discharging today. Pt did need a ride as her father did not get off work until this evening. SW set up trips for pateint as she did not have funds for a taxi. Trips is set for 11:00, SW notified patient and nurse.

## 2024-10-03 NOTE — PM.DS1
DS: Providers
Provider
Date of admission: 
10/02/24 09:37

Primary care physician: 
[facility]

Admitting clinician: Shaikh Mikaela
Attending physician on admission: Shaikh Mikaela
Consults: 


09/30/24
Consult to Dietitian Routine 
 Reason for consultation: poor appetite last 3 weeks
Consult to  Routine 
 Reason for consult:: Transportation

10/01/24 11:19
Consult to General Surgeon Routine 
 Consulting Provider: Wolf Berkowitz
 Reason for consultation: Possible gallbladder pathology



Attending physician on discharge: Shaikh Mikaela
Discharging clinician: Shaikh Mikaela
Anticipated date of discharge: 10/03/24

DS: Diagnosis
Discharge Diagnosis
(1) Exacerbation of Crohn's disease: 
      Qualifiers:
        Digestive disease complication type: without complication  Qualified Code(s): K50.90 - Crohn's disease, unspecified, without complications
(2) Abdominal pain: 
      Qualifiers:
        Abdominal location: generalized  Qualified Code(s): R10.84 - Generalized abdominal pain
(3) Bipolar 1 disorder: 
(4) Hypokalemia:

DS: Summary
Hospital Course
Hospital Course:
39-year-old female with prior history of Crohn's disease presented with 3-week history of generalized abdominal pain associated with nausea, vomiting, anorexia and poor oral intake.  Patient was previously on mesalamine for Crohn's disease but has 
not been using anything for her Crohn's disease for 2 years Patient reported small amount of blood in her stool. She was also febrile in ED. CT abdomen pelvis did not reveal any significant intra-abdominal pathology.  She had generalized abdominal 
pain but her pain was more localized in right upper quadrant.  She had an right upper quadrant ultrasound that did not reveal any gallbladder pathology she had a positive Tucker sign.  We ordered a HIDA scan to rule out gallbladder pathology and was 
essentially normal.  She was seen by general surgery who recommended medical management and no surgical intervention was warranted..
Patient was treated for possible Crohn's colitis/exacerbation with IV Solu-Medrol and empirical antibiotics.  She continued to have nausea and emesis during the course of admission.  She had a few episodes of vomiting overnight also but has been 
able to keep crackers and simple foods down.  Her pain is better but is still present.  While she is not completely back to her baseline, she is well enough to tolerate p.o. medications and p.o. diet.  Patient is medically stable for discharge on 
prednisone taper along with oral antibiotics.  She will also need Zofran as needed for nausea and vomiting.  She has an appointment with GI provider on the 16th.

Patient was instructed to return to ED if her pain worsens, or she develops intractable nausea and vomiting.


Status at Discharge
Functional status at discharge: independent ambulation
Overall status at discharge: patient is progressing back to baseline
Time Spent with Patient
Time attestation: 
Total time spent providing and/or coordinating discharge services:


Exam
Constitutional
Vital Signs, click to edit/add: 
Last Vital Signs

Temp  98.1 F   10/03/24 05:00
Pulse  97 H  10/03/24 05:00
Resp  16   10/03/24 05:00
BP  133/95 H  10/03/24 05:00
Pulse Ox  97   10/03/24 05:27
O2 Del Method  Room Air  10/03/24 05:27


Documenting provider has reviewed patient's vital signs: yes
Common normals: no apparent distress and oriented x3
General appearance: cooperative
Respiratory
Common normals: normal respiratory effort and clear to auscultation bilaterally
Effort & inspection: able to speak in complete sentences
Auscultation: clear to auscultation bilaterally
Cardio
Common normals: regular rate, S1 normal heart sound and S2 normal heart sound
Rate: regular rate
Heart sounds: S1 normal and S2 normal
GI
Common normals: Normal to inspection, nondistended, normoactive bowel sounds present, soft to palpation and no hepatosplenomegaly
Palpation: soft, tender Details: RUQ and no hepatosplenomegaly
Extremity
Common normals: no clubbing, cyanosis or edema
Neuro
Common normals: oriented x3, moves all extremities and no focal motor deficits
Psych
Common normals: mental status grossly normal, denies hallucinations, denies homicidal ideation and denies suicidal ideation

DS: Data
Data Completed and Pending
Labs on day of discharge: 
Labs from last 24 hours

  10/03/24
  07:46
WBC  10.3
RBC  4.13 L
Hgb  12.0
Hct  36.1
MCV  87.4
MCH  29.1
MCHC  33.2
RDW  13.2
Plt Count  329
MPV  9.1 L
Neut % (Auto)  51.6
Lymph % (Auto)  41.9
Mono % (Auto)  5.4
Eos % (Auto)  0.5 L
Baso % (Auto)  0.3
Neut # (Auto)  5.3
Lymph # (Auto)  4.3 H
Mono # (Auto)  0.6
Eos # (Auto)  0.1
Baso # (Auto)  0.0
Abs Immat Gran (auto)  0.03
Imm/Tot Granulo (auto)  0.3
Sodium  139
Potassium  3.2 L
Chloride  102
Carbon Dioxide  24.6
Anion Gap  15.6
BUN  4.0 L
Creatinine  1.09 H
Est GFR ( Amer)  >60
Est GFR (Non-Af Amer)  56 L
BUN/Creatinine Ratio  3.7
Glucose  108 H
Calcium  9.3
Total Bilirubin  0.3
AST  10 L
ALT  21
Alkaline Phosphatase  67
Total Protein  6.6
Albumin  3.2 L
Globulin  3.4
Albumin/Globulin Ratio  0.9




Discharge Plan
Discharge
Disposition: Home, Self-Care

Discharge Medications:
New
  ondansetron 4 mg tablet,disintegrating 
   4 mg PO Q6H PRN (Reason: nausea and vomiting) Qty: 20 0RF
  ciprofloxacin HCl 500 mg tablet 
   500 mg PO BID Qty: 10 0RF
  metronidazole 500 mg tablet 
   500 mg PO Q8H Qty: 15 0RF
  prednisone 20 mg tablet 
   20 mg PO BID Qty: 10 0RF

Continued
  dicyclomine 20 mg tablet 
   20 mg PO TID 
   Rx Instructions:
   X 90 DAYS
  Rexulti 1 mg tablet 
   1 mg PO DAILY 

Discontinued
  ondansetron HCl 4 mg tablet 
   4 mg PO BID PRN (Reason: nausea) 

Activity: increase activity as tolerated

Diet: advance to your usual diet

Print Language: English

Forms:  Portal Instructions

Follow Up Appointments: F/u with PCP in one week

## 2024-10-03 NOTE — P.DS_ITS
DS: Providers    
Provider    
Date of admission:     
10/02/24 09:37    
    
Primary care physician:     
[facility]    
    
Admitting clinician: Shaikh Mikaela    
Attending physician on admission: Shaikh Mikaela    
Consults:     
                                            
    
09/30/24    
Consult to Dietitian Routine     
   Reason for consultation: poor appetite last 3 weeks    
Consult to  Routine     
   Reason for consult:: Transportation    
    
10/01/24 11:19    
Consult to General Surgeon Routine     
   Consulting Provider: Wolf Berkowitz    
   Reason for consultation: Possible gallbladder pathology    
    
    
    
Attending physician on discharge: Shaikh Mikaela    
Discharging clinician: Shaikh Mikaela    
Anticipated date of discharge: 10/03/24    
    
DS: Diagnosis    
Discharge Diagnosis    
(1) Exacerbation of Crohn's disease:     
      Qualifiers:    
        Digestive disease complication type: without complication  Qualified   
Code(s): K50.90 - Crohn's disease, unspecified, without complications    
(2) Abdominal pain:     
      Qualifiers:    
        Abdominal location: generalized  Qualified Code(s): R10.84 - Generalized  
abdominal pain    
(3) Bipolar 1 disorder:     
(4) Hypokalemia:    
    
DS: Summary    
Hospital Course    
Hospital Course:    
39-year-old female with prior history of Crohn's disease presented with 3-week   
history of generalized abdominal pain associated with nausea, vomiting, anorexia  
and poor oral intake.  Patient was previously on mesalamine for Crohn's disease   
but has not been using anything for her Crohn's disease for 2 years Patient   
reported small amount of blood in her stool. She was also febrile in ED. CT   
abdomen pelvis did not reveal any significant intra-abdominal pathology.  She   
had generalized abdominal pain but her pain was more localized in right upper   
quadrant.  She had an right upper quadrant ultrasound that did not reveal any   
gallbladder pathology she had a positive Tucker sign.  We ordered a HIDA scan to  
rule out gallbladder pathology and was essentially normal.  She was seen by   
general surgery who recommended medical management and no surgical intervention   
was warranted..    
Patient was treated for possible Crohn's colitis/exacerbation with IV Solu-  
Medrol and empirical antibiotics.  She continued to have nausea and emesis   
during the course of admission.  She had a few episodes of vomiting overnight   
also but has been able to keep crackers and simple foods down.  Her pain is   
better but is still present.  While she is not completely back to her baseline,   
she is well enough to tolerate p.o. medications and p.o. diet.  Patient is   
medically stable for discharge on prednisone taper along with oral antibiotics.   
She will also need Zofran as needed for nausea and vomiting.  She has an   
appointment with GI provider on the 16th.    
    
Patient was instructed to return to ED if her pain worsens, or she develops   
intractable nausea and vomiting.    
    
    
Status at Discharge    
Functional status at discharge: independent ambulation    
Overall status at discharge: patient is progressing back to baseline    
Time Spent with Patient    
Time attestation:     
Total time spent providing and/or coordinating discharge services:    
    
    
Exam    
Constitutional    
Vital Signs, click to edit/add:     
                                Last Vital Signs    
    
    
    
Temp  98.1 F   10/03/24 05:00    
     
Pulse  97 H  10/03/24 05:00    
     
Resp  16   10/03/24 05:00    
     
BP  133/95 H  10/03/24 05:00    
     
Pulse Ox  97   10/03/24 05:27    
     
O2 Del Method  Room Air  10/03/24 05:27    
    
    
    
Documenting provider has reviewed patient's vital signs: yes    
Common normals: no apparent distress and oriented x3    
General appearance: cooperative    
Respiratory    
Common normals: normal respiratory effort and clear to auscultation bilaterally    
Effort & inspection: able to speak in complete sentences    
Auscultation: clear to auscultation bilaterally    
Cardio    
Common normals: regular rate, S1 normal heart sound and S2 normal heart sound    
Rate: regular rate    
Heart sounds: S1 normal and S2 normal    
GI    
Common normals: Normal to inspection, nondistended, normoactive bowel sounds   
present, soft to palpation and no hepatosplenomegaly    
Palpation: soft, tender Details: RUQ and no hepatosplenomegaly    
Extremity    
Common normals: no clubbing, cyanosis or edema    
Neuro    
Common normals: oriented x3, moves all extremities and no focal motor deficits    
Psych    
Common normals: mental status grossly normal, denies hallucinations, denies   
homicidal ideation and denies suicidal ideation    
    
DS: Data    
Data Completed and Pending    
Labs on day of discharge:     
                             Labs from last 24 hours    
    
    
    
  10/03/24    
    
  07:46    
     
WBC  10.3    
     
RBC  4.13 L    
     
Hgb  12.0    
     
Hct  36.1    
     
MCV  87.4    
     
MCH  29.1    
     
MCHC  33.2    
     
RDW  13.2    
     
Plt Count  329    
     
MPV  9.1 L    
     
Neut % (Auto)  51.6    
     
Lymph % (Auto)  41.9    
     
Mono % (Auto)  5.4    
     
Eos % (Auto)  0.5 L    
     
Baso % (Auto)  0.3    
     
Neut # (Auto)  5.3    
     
Lymph # (Auto)  4.3 H    
     
Mono # (Auto)  0.6    
     
Eos # (Auto)  0.1    
     
Baso # (Auto)  0.0    
     
Abs Immat Gran (auto)  0.03    
     
Imm/Tot Granulo (auto)  0.3    
     
Sodium  139    
     
Potassium  3.2 L    
     
Chloride  102    
     
Carbon Dioxide  24.6    
     
Anion Gap  15.6    
     
BUN  4.0 L    
     
Creatinine  1.09 H    
     
Est GFR ( Amer)  >60    
     
Est GFR (Non-Af Amer)  56 L    
     
BUN/Creatinine Ratio  3.7    
     
Glucose  108 H    
     
Calcium  9.3    
     
Total Bilirubin  0.3    
     
AST  10 L    
     
ALT  21    
     
Alkaline Phosphatase  67    
     
Total Protein  6.6    
     
Albumin  3.2 L    
     
Globulin  3.4    
     
Albumin/Globulin Ratio  0.9    
    
    
    
    
    
Discharge Plan    
Discharge    
Disposition: Home, Self-Care    
    
Discharge Medications:    
New    
  ondansetron 4 mg tablet,disintegrating     
   4 mg PO Q6H PRN (Reason: nausea and vomiting) Qty: 20 0RF    
  ciprofloxacin HCl 500 mg tablet     
   500 mg PO BID Qty: 10 0RF    
  metronidazole 500 mg tablet     
   500 mg PO Q8H Qty: 15 0RF    
  prednisone 20 mg tablet     
   20 mg PO BID Qty: 10 0RF    
    
Continued    
  dicyclomine 20 mg tablet     
   20 mg PO TID     
   Rx Instructions:    
   X 90 DAYS    
  Rexulti 1 mg tablet     
   1 mg PO DAILY     
    
Discontinued    
  ondansetron HCl 4 mg tablet     
   4 mg PO BID PRN (Reason: nausea)     
    
Activity: increase activity as tolerated    
    
Diet: advance to your usual diet    
    
Print Language: English    
    
Forms:  Portal Instructions    
    
Follow Up Appointments: F/u with PCP in one week

## 2024-10-07 NOTE — CM.DCFOLLOWU
Person spoke with: Shani

How are you feeling? So much better

How is your pain? No pain

Did you understand your discharge instructions? Yes

Do you have any questions about your discharge instructions? No

Were you given any prescriptions at discharge? Yes

Were you able to get your prescriptions filled? Yes

Do you understand how to take your medications as ordered? Yes

Do you have any questions about your follow up appointment and do you plan to keep your follow up appointment? I see my PCP tomorrow and my GI doctor next week, plan on going to appts.

Is there anything else that you would like to discuss? No

Questions/Comments/Concerns/Other: